# Patient Record
Sex: MALE | Race: WHITE | Employment: OTHER | URBAN - METROPOLITAN AREA
[De-identification: names, ages, dates, MRNs, and addresses within clinical notes are randomized per-mention and may not be internally consistent; named-entity substitution may affect disease eponyms.]

---

## 2017-05-12 ENCOUNTER — TELEPHONE (OUTPATIENT)
Dept: CARDIOLOGY CLINIC | Age: 82
End: 2017-05-12

## 2017-06-01 ENCOUNTER — OFFICE VISIT (OUTPATIENT)
Dept: CARDIOLOGY CLINIC | Age: 82
End: 2017-06-01

## 2017-06-01 VITALS
HEART RATE: 56 BPM | SYSTOLIC BLOOD PRESSURE: 110 MMHG | OXYGEN SATURATION: 97 % | WEIGHT: 174 LBS | DIASTOLIC BLOOD PRESSURE: 56 MMHG | BODY MASS INDEX: 24.36 KG/M2 | HEIGHT: 71 IN

## 2017-06-01 DIAGNOSIS — I10 ESSENTIAL HYPERTENSION: ICD-10-CM

## 2017-06-01 DIAGNOSIS — I25.119 CORONARY ARTERY DISEASE INVOLVING NATIVE CORONARY ARTERY OF NATIVE HEART WITH ANGINA PECTORIS (HCC): Primary | ICD-10-CM

## 2017-06-01 PROCEDURE — 4040F PNEUMOC VAC/ADMIN/RCVD: CPT | Performed by: NURSE PRACTITIONER

## 2017-06-01 PROCEDURE — 1111F DSCHRG MED/CURRENT MED MERGE: CPT | Performed by: NURSE PRACTITIONER

## 2017-06-01 PROCEDURE — G8420 CALC BMI NORM PARAMETERS: HCPCS | Performed by: NURSE PRACTITIONER

## 2017-06-01 PROCEDURE — 1036F TOBACCO NON-USER: CPT | Performed by: NURSE PRACTITIONER

## 2017-06-01 PROCEDURE — G8598 ASA/ANTIPLAT THER USED: HCPCS | Performed by: NURSE PRACTITIONER

## 2017-06-01 PROCEDURE — G8427 DOCREV CUR MEDS BY ELIG CLIN: HCPCS | Performed by: NURSE PRACTITIONER

## 2017-06-01 PROCEDURE — 1123F ACP DISCUSS/DSCN MKR DOCD: CPT | Performed by: NURSE PRACTITIONER

## 2017-06-01 PROCEDURE — 99213 OFFICE O/P EST LOW 20 MIN: CPT | Performed by: NURSE PRACTITIONER

## 2017-12-01 ENCOUNTER — OFFICE VISIT (OUTPATIENT)
Dept: CARDIOLOGY CLINIC | Age: 82
End: 2017-12-01

## 2017-12-01 VITALS
HEART RATE: 58 BPM | BODY MASS INDEX: 23.66 KG/M2 | OXYGEN SATURATION: 98 % | DIASTOLIC BLOOD PRESSURE: 64 MMHG | HEIGHT: 71 IN | SYSTOLIC BLOOD PRESSURE: 138 MMHG | WEIGHT: 169 LBS

## 2017-12-01 DIAGNOSIS — I25.119 CORONARY ARTERY DISEASE INVOLVING NATIVE CORONARY ARTERY OF NATIVE HEART WITH ANGINA PECTORIS (HCC): Primary | ICD-10-CM

## 2017-12-01 DIAGNOSIS — I10 ESSENTIAL HYPERTENSION: ICD-10-CM

## 2017-12-01 DIAGNOSIS — E78.2 MIXED HYPERLIPIDEMIA: ICD-10-CM

## 2017-12-01 PROCEDURE — 4040F PNEUMOC VAC/ADMIN/RCVD: CPT | Performed by: INTERNAL MEDICINE

## 2017-12-01 PROCEDURE — 1036F TOBACCO NON-USER: CPT | Performed by: INTERNAL MEDICINE

## 2017-12-01 PROCEDURE — G8420 CALC BMI NORM PARAMETERS: HCPCS | Performed by: INTERNAL MEDICINE

## 2017-12-01 PROCEDURE — G8427 DOCREV CUR MEDS BY ELIG CLIN: HCPCS | Performed by: INTERNAL MEDICINE

## 2017-12-01 PROCEDURE — G8484 FLU IMMUNIZE NO ADMIN: HCPCS | Performed by: INTERNAL MEDICINE

## 2017-12-01 PROCEDURE — 99214 OFFICE O/P EST MOD 30 MIN: CPT | Performed by: INTERNAL MEDICINE

## 2017-12-01 PROCEDURE — G8598 ASA/ANTIPLAT THER USED: HCPCS | Performed by: INTERNAL MEDICINE

## 2017-12-01 PROCEDURE — 1123F ACP DISCUSS/DSCN MKR DOCD: CPT | Performed by: INTERNAL MEDICINE

## 2017-12-01 NOTE — PROGRESS NOTES
hepatomegaly  Extremities: No edema, cyanosis, or clubbing. Pulses are 2+ radial/dorsalis pedis bilaterally. Cap refill brisk. R wrist site unremarkable. Neurological: No focal deficits. Skin: Skin is warm and dry. Psychiatric: He has a normal mood and affect. His speech is normal and behavior is normal.     Lab Review:   Lab Results   Component Value Date    TRIG 133 10/03/2017    HDL 46 10/03/2017    LDLCALC 62 10/03/2017    LABVLDL 27 10/03/2017     Lab Results   Component Value Date     10/03/2017    K 4.6 10/03/2017     10/03/2017    CO2 23 10/03/2017    BUN 25 10/03/2017    CREATININE 1.2 10/03/2017    GLUCOSE 94 10/03/2017    CALCIUM 9.6 10/03/2017      Lab Results   Component Value Date    WBC 6.3 05/10/2017    HGB 12.9 (L) 05/10/2017    HCT 38.4 (L) 05/10/2017    MCV 92.8 05/10/2017     05/10/2017     Lexiscan-Myoview 5/11/2017:  Normal LV size and systolic function.    Large mixed inferior partially reversible defect that likely represents    scar/ischemia. There is also diaphragmatic attenuation.    Non-diagnostic EKG response due to failure to reach target heart rate .    Overall findings represent a intermediate risk scan.      Angiogram/PCI 5/15/2017:  1. Dominant right coronary artery with a 99% posterior descending artery  ostial stenosis. 2. Patent left main trunk. 3. A 30% to 40% mid left anterior descending stenosis. 4. A 20% to 30% stenosis of the mid circumflex artery, which is small artery. 5. Left-to-right collaterals from both circumflex and the left anterior  descending artery. 6. Left ventricular systolic function with normal left ____ hemodynamics. 7. Successful angioplasty followed by drug-eluting stent deployment in the  posterior descending artery branch of the right coronary artery, 99% stenosis  reduced to 0% with a final diameter of 3.11 in the proximal segment and 2.91  in the distal segment with no side effects.        Assessment:    Coronary artery

## 2017-12-01 NOTE — PATIENT INSTRUCTIONS
trans fat  · Read food labels, and try to avoid saturated and trans fats. They increase your risk of heart disease. Trans fat is found in many processed foods such as cookies and crackers. · Use olive or canola oil when you cook. Try cholesterol-lowering spreads, such as Benecol or Take Control. · Bake, broil, grill, or steam foods instead of frying them. · Choose lean meats instead of high-fat meats such as hot dogs and sausages. Cut off all visible fat when you prepare meat. · Eat fish, skinless poultry, and meat alternatives such as soy products instead of high-fat meats. Soy products, such as tofu, may be especially good for your heart. · Choose low-fat or fat-free milk and dairy products. Eat fish  · Eat at least two servings of fish a week. Certain fish, such as salmon and tuna, contain omega-3 fatty acids, which may help reduce your risk of heart attack. Eat foods high in fiber  · Eat a variety of grain products every day. Include whole-grain foods that have lots of fiber and nutrients. Examples of whole-grain foods include oats, whole wheat bread, and brown rice. · Buy whole-grain breads and cereals, instead of white bread or pastries. Limit salt and sodium  · Limit how much salt and sodium you eat to help lower your blood pressure. · Taste food before you salt it. Add only a little salt when you think you need it. With time, your taste buds will adjust to less salt. · Eat fewer snack items, fast foods, and other high-salt, processed foods. Check food labels for the amount of sodium in packaged foods. · Choose low-sodium versions of canned goods (such as soups, vegetables, and beans). Limit sugar  · Limit drinks and foods with added sugar. These include candy, desserts, and soda pop. Limit alcohol  · Limit alcohol to no more than 2 drinks a day for men and 1 drink a day for women. Too much alcohol can cause health problems. When should you call for help?   Watch closely for changes in your [employer], and be sure to contact your doctor if:  · You would like help planning heart-healthy meals. Where can you learn more? Go to https://chpepiceweb.OTOY. org and sign in to your [employer] account. Enter V137 in the [employer] box to learn more about \"Heart-Healthy Diet: Care Instructions. \"     If you do not have an account, please click on the \"Sign Up Now\" link. Current as of: April 3, 2017  Content Version: 11.3  © 2773-0985 [employer]. Care instructions adapted under license by [facility] ([facility]). If you have questions about a medical condition or this instruction, always ask your healthcare professional. [address] any warranty or liability for your use of this information. Patient Education        High Blood Pressure: Care Instructions  Your Care Instructions  If your blood pressure is usually above 140/90, you have high blood pressure, or hypertension. That means the top number is 140 or higher or the bottom number is 90 or higher, or both. Despite what a lot of people think, high blood pressure usually doesn't cause headaches or make you feel dizzy or lightheaded. It usually has no symptoms. But it does increase your risk for heart attack, stroke, and kidney or eye damage. The higher your blood pressure, the more your risk increases. Your doctor will give you a goal for your blood pressure. Your goal will be based on your health and your age. An example of a goal is to keep your blood pressure below 140/90. Lifestyle changes, such as eating healthy and being active, are always important to help lower blood pressure. You might also take medicine to reach your blood pressure goal.  Follow-up care is a key part of your treatment and safety. Be sure to make and go to all appointments, and call your doctor if you are having problems. It's also a good idea to know your test results and keep a list of the medicines you take.   How can you care for

## 2018-04-03 PROBLEM — R07.9 CHEST PAIN: Status: ACTIVE | Noted: 2018-04-03

## 2018-04-08 ENCOUNTER — CARE COORDINATION (OUTPATIENT)
Dept: CASE MANAGEMENT | Age: 83
End: 2018-04-08

## 2018-04-08 DIAGNOSIS — R07.9 CHEST PAIN, UNSPECIFIED TYPE: Primary | ICD-10-CM

## 2018-04-08 PROCEDURE — 1111F DSCHRG MED/CURRENT MED MERGE: CPT | Performed by: INTERNAL MEDICINE

## 2018-04-11 ENCOUNTER — CARE COORDINATION (OUTPATIENT)
Dept: CASE MANAGEMENT | Age: 83
End: 2018-04-11

## 2018-04-18 ENCOUNTER — CARE COORDINATION (OUTPATIENT)
Dept: CASE MANAGEMENT | Age: 83
End: 2018-04-18

## 2018-04-20 ENCOUNTER — CARE COORDINATION (OUTPATIENT)
Dept: CASE MANAGEMENT | Age: 83
End: 2018-04-20

## 2018-04-20 ENCOUNTER — OFFICE VISIT (OUTPATIENT)
Dept: CARDIOLOGY CLINIC | Age: 83
End: 2018-04-20

## 2018-04-20 VITALS
SYSTOLIC BLOOD PRESSURE: 122 MMHG | BODY MASS INDEX: 22.54 KG/M2 | WEIGHT: 161 LBS | HEART RATE: 60 BPM | HEIGHT: 71 IN | DIASTOLIC BLOOD PRESSURE: 60 MMHG | OXYGEN SATURATION: 98 %

## 2018-04-20 DIAGNOSIS — E78.2 MIXED HYPERLIPIDEMIA: ICD-10-CM

## 2018-04-20 DIAGNOSIS — R07.89 OTHER CHEST PAIN: ICD-10-CM

## 2018-04-20 DIAGNOSIS — I10 ESSENTIAL HYPERTENSION: ICD-10-CM

## 2018-04-20 DIAGNOSIS — Z01.812 PRE-PROCEDURE LAB EXAM: ICD-10-CM

## 2018-04-20 DIAGNOSIS — I25.119 CORONARY ARTERY DISEASE INVOLVING NATIVE CORONARY ARTERY OF NATIVE HEART WITH ANGINA PECTORIS (HCC): Primary | ICD-10-CM

## 2018-04-20 DIAGNOSIS — R94.39 ABNORMAL CARDIOVASCULAR STRESS TEST: ICD-10-CM

## 2018-04-20 LAB
ANION GAP SERPL CALCULATED.3IONS-SCNC: 12 MMOL/L (ref 3–16)
BASOPHILS ABSOLUTE: 0.1 K/UL (ref 0–0.2)
BASOPHILS RELATIVE PERCENT: 1 %
BUN BLDV-MCNC: 25 MG/DL (ref 7–20)
CALCIUM SERPL-MCNC: 9 MG/DL (ref 8.3–10.6)
CHLORIDE BLD-SCNC: 104 MMOL/L (ref 99–110)
CO2: 27 MMOL/L (ref 21–32)
CREAT SERPL-MCNC: 1.3 MG/DL (ref 0.8–1.3)
EOSINOPHILS ABSOLUTE: 0.2 K/UL (ref 0–0.6)
EOSINOPHILS RELATIVE PERCENT: 2 %
GFR AFRICAN AMERICAN: >60
GFR NON-AFRICAN AMERICAN: 52
GLUCOSE BLD-MCNC: 75 MG/DL (ref 70–99)
HCT VFR BLD CALC: 39.1 % (ref 40.5–52.5)
HEMOGLOBIN: 13 G/DL (ref 13.5–17.5)
INR BLD: 1.05 (ref 0.85–1.15)
LYMPHOCYTES ABSOLUTE: 1.7 K/UL (ref 1–5.1)
LYMPHOCYTES RELATIVE PERCENT: 22.7 %
MCH RBC QN AUTO: 31.7 PG (ref 26–34)
MCHC RBC AUTO-ENTMCNC: 33.2 G/DL (ref 31–36)
MCV RBC AUTO: 95.5 FL (ref 80–100)
MONOCYTES ABSOLUTE: 0.6 K/UL (ref 0–1.3)
MONOCYTES RELATIVE PERCENT: 8.4 %
NEUTROPHILS ABSOLUTE: 4.9 K/UL (ref 1.7–7.7)
NEUTROPHILS RELATIVE PERCENT: 65.9 %
PDW BLD-RTO: 13.4 % (ref 12.4–15.4)
PLATELET # BLD: 200 K/UL (ref 135–450)
PMV BLD AUTO: 7.6 FL (ref 5–10.5)
POTASSIUM SERPL-SCNC: 5.2 MMOL/L (ref 3.5–5.1)
PROTHROMBIN TIME: 11.9 SEC (ref 9.6–13)
RBC # BLD: 4.09 M/UL (ref 4.2–5.9)
SODIUM BLD-SCNC: 143 MMOL/L (ref 136–145)
WBC # BLD: 7.5 K/UL (ref 4–11)

## 2018-04-20 PROCEDURE — 1123F ACP DISCUSS/DSCN MKR DOCD: CPT | Performed by: INTERNAL MEDICINE

## 2018-04-20 PROCEDURE — 1111F DSCHRG MED/CURRENT MED MERGE: CPT | Performed by: INTERNAL MEDICINE

## 2018-04-20 PROCEDURE — G8427 DOCREV CUR MEDS BY ELIG CLIN: HCPCS | Performed by: INTERNAL MEDICINE

## 2018-04-20 PROCEDURE — G8598 ASA/ANTIPLAT THER USED: HCPCS | Performed by: INTERNAL MEDICINE

## 2018-04-20 PROCEDURE — 4040F PNEUMOC VAC/ADMIN/RCVD: CPT | Performed by: INTERNAL MEDICINE

## 2018-04-20 PROCEDURE — G8420 CALC BMI NORM PARAMETERS: HCPCS | Performed by: INTERNAL MEDICINE

## 2018-04-20 PROCEDURE — 1036F TOBACCO NON-USER: CPT | Performed by: INTERNAL MEDICINE

## 2018-04-20 PROCEDURE — 99215 OFFICE O/P EST HI 40 MIN: CPT | Performed by: INTERNAL MEDICINE

## 2018-04-24 ENCOUNTER — TELEPHONE (OUTPATIENT)
Dept: CARDIOLOGY CLINIC | Age: 83
End: 2018-04-24

## 2018-04-25 ENCOUNTER — HOSPITAL ENCOUNTER (OUTPATIENT)
Dept: CARDIAC CATH/INVASIVE PROCEDURES | Age: 83
Discharge: HOME | End: 2018-04-25
Attending: INTERNAL MEDICINE | Admitting: INTERNAL MEDICINE

## 2018-04-25 VITALS — HEIGHT: 71 IN | WEIGHT: 161.38 LBS | BODY MASS INDEX: 22.59 KG/M2

## 2018-04-25 PROCEDURE — 99152 MOD SED SAME PHYS/QHP 5/>YRS: CPT | Performed by: INTERNAL MEDICINE

## 2018-04-25 PROCEDURE — 93454 CORONARY ARTERY ANGIO S&I: CPT | Performed by: INTERNAL MEDICINE

## 2018-04-25 RX ORDER — ASPIRIN 325 MG
325 TABLET ORAL ONCE
Status: DISCONTINUED | OUTPATIENT
Start: 2018-04-25 | End: 2018-04-25

## 2018-04-25 RX ORDER — SODIUM CHLORIDE 9 MG/ML
INJECTION, SOLUTION INTRAVENOUS CONTINUOUS
Status: DISCONTINUED | OUTPATIENT
Start: 2018-04-25 | End: 2018-04-25 | Stop reason: SDUPTHER

## 2018-04-25 RX ORDER — SODIUM CHLORIDE 0.9 % (FLUSH) 0.9 %
10 SYRINGE (ML) INJECTION EVERY 12 HOURS SCHEDULED
Status: DISCONTINUED | OUTPATIENT
Start: 2018-04-25 | End: 2018-04-26 | Stop reason: HOSPADM

## 2018-04-25 RX ORDER — SODIUM CHLORIDE 0.9 % (FLUSH) 0.9 %
10 SYRINGE (ML) INJECTION EVERY 12 HOURS SCHEDULED
Status: DISCONTINUED | OUTPATIENT
Start: 2018-04-25 | End: 2018-04-25 | Stop reason: SDUPTHER

## 2018-04-25 RX ORDER — ONDANSETRON 2 MG/ML
4 INJECTION INTRAMUSCULAR; INTRAVENOUS EVERY 6 HOURS PRN
Status: DISCONTINUED | OUTPATIENT
Start: 2018-04-25 | End: 2018-04-26 | Stop reason: HOSPADM

## 2018-04-25 RX ORDER — SODIUM CHLORIDE 9 MG/ML
INJECTION, SOLUTION INTRAVENOUS CONTINUOUS
Status: DISCONTINUED | OUTPATIENT
Start: 2018-04-25 | End: 2018-04-26 | Stop reason: HOSPADM

## 2018-04-25 RX ORDER — ACETAMINOPHEN 325 MG/1
650 TABLET ORAL EVERY 4 HOURS PRN
Status: DISCONTINUED | OUTPATIENT
Start: 2018-04-25 | End: 2018-04-26 | Stop reason: HOSPADM

## 2018-04-25 RX ORDER — ASPIRIN 81 MG/1
81 TABLET, CHEWABLE ORAL ONCE
Status: DISCONTINUED | OUTPATIENT
Start: 2018-04-25 | End: 2018-04-25

## 2018-04-25 RX ORDER — SODIUM CHLORIDE 0.9 % (FLUSH) 0.9 %
10 SYRINGE (ML) INJECTION PRN
Status: DISCONTINUED | OUTPATIENT
Start: 2018-04-25 | End: 2018-04-25 | Stop reason: SDUPTHER

## 2018-04-25 RX ORDER — SODIUM CHLORIDE 0.9 % (FLUSH) 0.9 %
10 SYRINGE (ML) INJECTION EVERY 12 HOURS SCHEDULED
Status: DISCONTINUED | OUTPATIENT
Start: 2018-04-25 | End: 2018-04-25 | Stop reason: ALTCHOICE

## 2018-04-25 RX ORDER — SODIUM CHLORIDE 0.9 % (FLUSH) 0.9 %
10 SYRINGE (ML) INJECTION PRN
Status: DISCONTINUED | OUTPATIENT
Start: 2018-04-25 | End: 2018-04-26 | Stop reason: HOSPADM

## 2018-04-25 RX ORDER — SODIUM CHLORIDE 0.9 % (FLUSH) 0.9 %
10 SYRINGE (ML) INJECTION PRN
Status: DISCONTINUED | OUTPATIENT
Start: 2018-04-25 | End: 2018-04-25 | Stop reason: ALTCHOICE

## 2018-06-01 ENCOUNTER — OFFICE VISIT (OUTPATIENT)
Dept: CARDIOLOGY CLINIC | Age: 83
End: 2018-06-01

## 2018-06-01 VITALS
OXYGEN SATURATION: 97 % | HEIGHT: 71 IN | SYSTOLIC BLOOD PRESSURE: 136 MMHG | WEIGHT: 164 LBS | HEART RATE: 62 BPM | DIASTOLIC BLOOD PRESSURE: 64 MMHG | BODY MASS INDEX: 22.96 KG/M2

## 2018-06-01 DIAGNOSIS — E78.2 MIXED HYPERLIPIDEMIA: ICD-10-CM

## 2018-06-01 DIAGNOSIS — I10 ESSENTIAL HYPERTENSION: ICD-10-CM

## 2018-06-01 DIAGNOSIS — I25.10 CORONARY ARTERY DISEASE INVOLVING NATIVE CORONARY ARTERY OF NATIVE HEART WITHOUT ANGINA PECTORIS: Primary | ICD-10-CM

## 2018-06-01 PROCEDURE — G8427 DOCREV CUR MEDS BY ELIG CLIN: HCPCS | Performed by: INTERNAL MEDICINE

## 2018-06-01 PROCEDURE — 4040F PNEUMOC VAC/ADMIN/RCVD: CPT | Performed by: INTERNAL MEDICINE

## 2018-06-01 PROCEDURE — G8420 CALC BMI NORM PARAMETERS: HCPCS | Performed by: INTERNAL MEDICINE

## 2018-06-01 PROCEDURE — G8598 ASA/ANTIPLAT THER USED: HCPCS | Performed by: INTERNAL MEDICINE

## 2018-06-01 PROCEDURE — 1036F TOBACCO NON-USER: CPT | Performed by: INTERNAL MEDICINE

## 2018-06-01 PROCEDURE — 1123F ACP DISCUSS/DSCN MKR DOCD: CPT | Performed by: INTERNAL MEDICINE

## 2018-06-01 PROCEDURE — 99214 OFFICE O/P EST MOD 30 MIN: CPT | Performed by: INTERNAL MEDICINE

## 2018-10-01 ENCOUNTER — HOSPITAL ENCOUNTER (OUTPATIENT)
Dept: CT IMAGING | Age: 83
Discharge: HOME | End: 2018-10-01
Payer: MEDICARE

## 2018-10-01 ENCOUNTER — HOSPITAL ENCOUNTER (OUTPATIENT)
Dept: GENERAL RADIOLOGY | Age: 83
Discharge: HOME | End: 2018-10-01
Payer: MEDICARE

## 2018-10-01 DIAGNOSIS — R10.84 GENERALIZED ABDOMINAL PAIN: ICD-10-CM

## 2018-10-01 DIAGNOSIS — M54.9 BACK PAIN, UNSPECIFIED BACK LOCATION, UNSPECIFIED BACK PAIN LATERALITY, UNSPECIFIED CHRONICITY: ICD-10-CM

## 2018-10-01 PROCEDURE — 72072 X-RAY EXAM THORAC SPINE 3VWS: CPT

## 2018-10-01 PROCEDURE — 72110 X-RAY EXAM L-2 SPINE 4/>VWS: CPT

## 2018-10-01 PROCEDURE — 74176 CT ABD & PELVIS W/O CONTRAST: CPT

## 2018-10-04 ENCOUNTER — HOSPITAL ENCOUNTER (OUTPATIENT)
Dept: MRI IMAGING | Age: 83
Discharge: HOME | End: 2018-10-04
Payer: MEDICARE

## 2018-10-04 DIAGNOSIS — E78.2 MIXED HYPERLIPIDEMIA: ICD-10-CM

## 2018-10-04 DIAGNOSIS — R10.84 GENERALIZED ABDOMINAL PAIN: ICD-10-CM

## 2018-10-04 LAB
A/G RATIO: 1.5 (ref 1.1–2.2)
ALBUMIN SERPL-MCNC: 4.1 G/DL (ref 3.4–5)
ALP BLD-CCNC: 587 U/L (ref 40–129)
ALT SERPL-CCNC: 10 U/L (ref 10–40)
ANION GAP SERPL CALCULATED.3IONS-SCNC: 10 MMOL/L (ref 3–16)
AST SERPL-CCNC: 20 U/L (ref 15–37)
BILIRUB SERPL-MCNC: 0.3 MG/DL (ref 0–1)
BUN BLDV-MCNC: 25 MG/DL (ref 7–20)
CALCIUM SERPL-MCNC: 9.3 MG/DL (ref 8.3–10.6)
CHLORIDE BLD-SCNC: 105 MMOL/L (ref 99–110)
CHOLESTEROL, TOTAL: 165 MG/DL (ref 0–199)
CO2: 28 MMOL/L (ref 21–32)
CREAT SERPL-MCNC: 1.1 MG/DL (ref 0.8–1.3)
GFR AFRICAN AMERICAN: >60
GFR NON-AFRICAN AMERICAN: >60
GLOBULIN: 2.7 G/DL
GLUCOSE BLD-MCNC: 113 MG/DL (ref 70–99)
HDLC SERPL-MCNC: 55 MG/DL (ref 40–60)
LDL CHOLESTEROL CALCULATED: 88 MG/DL
POTASSIUM SERPL-SCNC: 4.8 MMOL/L (ref 3.5–5.1)
SODIUM BLD-SCNC: 143 MMOL/L (ref 136–145)
TOTAL PROTEIN: 6.8 G/DL (ref 6.4–8.2)
TRIGL SERPL-MCNC: 111 MG/DL (ref 0–150)
VLDLC SERPL CALC-MCNC: 22 MG/DL

## 2018-10-04 PROCEDURE — A9577 INJ MULTIHANCE: HCPCS | Performed by: INTERNAL MEDICINE

## 2018-10-04 PROCEDURE — 80061 LIPID PANEL: CPT

## 2018-10-04 PROCEDURE — 6360000004 HC RX CONTRAST MEDICATION: Performed by: INTERNAL MEDICINE

## 2018-10-04 PROCEDURE — 74183 MRI ABD W/O CNTR FLWD CNTR: CPT

## 2018-10-04 PROCEDURE — 80053 COMPREHEN METABOLIC PANEL: CPT

## 2018-10-04 PROCEDURE — 36415 COLL VENOUS BLD VENIPUNCTURE: CPT

## 2018-10-04 RX ADMIN — GADOBENATE DIMEGLUMINE 15 ML: 529 INJECTION, SOLUTION INTRAVENOUS at 15:47

## 2018-10-11 DIAGNOSIS — Z79.899 ON STATIN THERAPY: ICD-10-CM

## 2018-10-11 DIAGNOSIS — E78.00 HIGH CHOLESTEROL: ICD-10-CM

## 2018-10-11 DIAGNOSIS — I25.119 CORONARY ARTERY DISEASE INVOLVING NATIVE CORONARY ARTERY OF NATIVE HEART WITH ANGINA PECTORIS (HCC): Primary | ICD-10-CM

## 2018-10-11 RX ORDER — ATORVASTATIN CALCIUM 80 MG/1
80 TABLET, FILM COATED ORAL DAILY
Qty: 90 TABLET | Refills: 3 | Status: SHIPPED | OUTPATIENT
Start: 2018-10-11 | End: 2019-05-09

## 2018-10-11 NOTE — TELEPHONE ENCOUNTER
Lipitor increased to 80 mg daily by Tadeo Palacios NP after reviewing lab results. Already spoke to patient with result message and instructions. Patient verbalized and confirmed understanding.

## 2018-10-23 ENCOUNTER — APPOINTMENT (OUTPATIENT)
Dept: GENERAL RADIOLOGY | Age: 83
End: 2018-10-23
Payer: MEDICARE

## 2018-10-23 ENCOUNTER — APPOINTMENT (OUTPATIENT)
Dept: CT IMAGING | Age: 83
End: 2018-10-23
Payer: MEDICARE

## 2018-10-23 ENCOUNTER — HOSPITAL ENCOUNTER (EMERGENCY)
Age: 83
Discharge: HOME | End: 2018-10-23
Attending: EMERGENCY MEDICINE
Payer: MEDICARE

## 2018-10-23 VITALS
OXYGEN SATURATION: 97 % | RESPIRATION RATE: 18 BRPM | SYSTOLIC BLOOD PRESSURE: 126 MMHG | BODY MASS INDEX: 23.52 KG/M2 | HEART RATE: 80 BPM | HEIGHT: 71 IN | WEIGHT: 167.99 LBS | DIASTOLIC BLOOD PRESSURE: 82 MMHG | TEMPERATURE: 98 F

## 2018-10-23 DIAGNOSIS — R06.89 DYSPNEA AND RESPIRATORY ABNORMALITIES: Primary | ICD-10-CM

## 2018-10-23 DIAGNOSIS — R06.00 DYSPNEA AND RESPIRATORY ABNORMALITIES: Primary | ICD-10-CM

## 2018-10-23 DIAGNOSIS — R91.8 PULMONARY NODULES: ICD-10-CM

## 2018-10-23 LAB
A/G RATIO: 1.2 (ref 1.1–2.2)
ALBUMIN SERPL-MCNC: 3.8 G/DL (ref 3.4–5)
ALP BLD-CCNC: 593 U/L (ref 40–129)
ALT SERPL-CCNC: 14 U/L (ref 10–40)
ANION GAP SERPL CALCULATED.3IONS-SCNC: 12 MMOL/L (ref 3–16)
AST SERPL-CCNC: 24 U/L (ref 15–37)
BASOPHILS ABSOLUTE: 0.1 K/UL (ref 0–0.2)
BASOPHILS RELATIVE PERCENT: 0.8 %
BILIRUB SERPL-MCNC: 0.4 MG/DL (ref 0–1)
BUN BLDV-MCNC: 23 MG/DL (ref 7–20)
CALCIUM SERPL-MCNC: 9.4 MG/DL (ref 8.3–10.6)
CHLORIDE BLD-SCNC: 103 MMOL/L (ref 99–110)
CO2: 24 MMOL/L (ref 21–32)
CREAT SERPL-MCNC: 1 MG/DL (ref 0.8–1.3)
D DIMER: 1703 NG/ML DDU (ref 0–229)
EOSINOPHILS ABSOLUTE: 0.1 K/UL (ref 0–0.6)
EOSINOPHILS RELATIVE PERCENT: 1.8 %
GFR AFRICAN AMERICAN: >60
GFR NON-AFRICAN AMERICAN: >60
GLOBULIN: 3.1 G/DL
GLUCOSE BLD-MCNC: 95 MG/DL (ref 70–99)
HCT VFR BLD CALC: 39.1 % (ref 40.5–52.5)
HEMOGLOBIN: 12.8 G/DL (ref 13.5–17.5)
LYMPHOCYTES ABSOLUTE: 1.6 K/UL (ref 1–5.1)
LYMPHOCYTES RELATIVE PERCENT: 22.3 %
MCH RBC QN AUTO: 30.7 PG (ref 26–34)
MCHC RBC AUTO-ENTMCNC: 32.8 G/DL (ref 31–36)
MCV RBC AUTO: 93.8 FL (ref 80–100)
MONOCYTES ABSOLUTE: 0.6 K/UL (ref 0–1.3)
MONOCYTES RELATIVE PERCENT: 8.2 %
NEUTROPHILS ABSOLUTE: 4.7 K/UL (ref 1.7–7.7)
NEUTROPHILS RELATIVE PERCENT: 66.9 %
PDW BLD-RTO: 13.4 % (ref 12.4–15.4)
PLATELET # BLD: 162 K/UL (ref 135–450)
PMV BLD AUTO: 7.5 FL (ref 5–10.5)
POTASSIUM SERPL-SCNC: 4.4 MMOL/L (ref 3.5–5.1)
PRO-BNP: 272 PG/ML (ref 0–449)
RBC # BLD: 4.17 M/UL (ref 4.2–5.9)
SODIUM BLD-SCNC: 139 MMOL/L (ref 136–145)
TOTAL PROTEIN: 6.9 G/DL (ref 6.4–8.2)
TROPONIN: <0.01 NG/ML
WBC # BLD: 7 K/UL (ref 4–11)

## 2018-10-23 PROCEDURE — 85379 FIBRIN DEGRADATION QUANT: CPT

## 2018-10-23 PROCEDURE — 71045 X-RAY EXAM CHEST 1 VIEW: CPT

## 2018-10-23 PROCEDURE — 84484 ASSAY OF TROPONIN QUANT: CPT

## 2018-10-23 PROCEDURE — 93005 ELECTROCARDIOGRAM TRACING: CPT | Performed by: PHYSICIAN ASSISTANT

## 2018-10-23 PROCEDURE — 85025 COMPLETE CBC W/AUTO DIFF WBC: CPT

## 2018-10-23 PROCEDURE — 99285 EMERGENCY DEPT VISIT HI MDM: CPT

## 2018-10-23 PROCEDURE — 6360000004 HC RX CONTRAST MEDICATION: Performed by: PHYSICIAN ASSISTANT

## 2018-10-23 PROCEDURE — 71260 CT THORAX DX C+: CPT

## 2018-10-23 PROCEDURE — 83880 ASSAY OF NATRIURETIC PEPTIDE: CPT

## 2018-10-23 PROCEDURE — 93010 ELECTROCARDIOGRAM REPORT: CPT | Performed by: INTERNAL MEDICINE

## 2018-10-23 PROCEDURE — 80053 COMPREHEN METABOLIC PANEL: CPT

## 2018-10-23 RX ORDER — IBUPROFEN 200 MG
400 TABLET ORAL DAILY
COMMUNITY
End: 2019-05-09

## 2018-10-23 RX ADMIN — IOPAMIDOL 75 ML: 755 INJECTION, SOLUTION INTRAVENOUS at 09:03

## 2018-10-23 ASSESSMENT — ENCOUNTER SYMPTOMS
VOMITING: 0
BACK PAIN: 0
ABDOMINAL PAIN: 0
SORE THROAT: 0
NAUSEA: 0
COUGH: 0
SHORTNESS OF BREATH: 1

## 2018-10-23 NOTE — ED PROVIDER NOTES
[facility]  eMERGENCY dEPARTMENT eNCOUnter   Physician Attestation    Pt Name: Eber Martinez  MRN: [id number]  [place] 10/27/1932  Date of evaluation: 10/23/18        Physician Note:    I havepersonally performed and/or participated in the history, exam and medical decision making and agree with all pertinent clinical information. I have also reviewed and agree with the past medical, family and social historyunless otherwise noted. I have personally performed a face to face diagnostic evaluation onthis patient. I have reviewed the mid-levels findings and agree. History: This is an 80-year-old male with known coronary artery disease. Had a positive stress test back in April which resulted in a cardiac catheterization however was determined that the stress test was falsely positive and medical management was continued. He comes in with at least a few days worth of palpitations and shortness of breath. Describes orthopnea as well as exertional shortness of breath. His wife took his pulse and she would note that it was irregular. He does describe some palpitations. On the EKG here this appears to be atrial fibrillation competing with sinus bradycardia. Basically the first 5 seconds of this EKG looks like A. fib and the second 5 seconds looks like sinus bradycardia. On the monitor I'm seeing frequent APCs but not A. fib. Physical Exam   Constitutional: He is oriented to person, place, and time. He appears well-developed and well-nourished. HENT:   Head: Normocephalic and atraumatic. Right Ear: External ear normal.   Left Ear: External ear normal.   Eyes: Right eye exhibits no discharge. Left eye exhibits no discharge. No scleral icterus. Neck: Normal range of motion. No JVD present. No tracheal deviation present. No thyromegaly present. Cardiovascular: Normal rate, regular rhythm and normal heart sounds. Frequent extrasystoles are present.  Exam reveals no gallop and no

## 2018-10-23 NOTE — ED NOTES
D/C: Order noted for d/c. Pt confirmed d/c paperwork  have correct name. Discharge and education instructions reviewed with patient. Teach-back successful. Pt verbalized understanding and signed d/c papers. Pt denied questions at this time. No acute distress noted. Patient instructed to follow-up as noted - return to emergency department if symptoms worsen. Patient verbalized understanding. Discharged per EDMD with discharge instructions. Pt discharged per ambulation to private vehicle. Patient stable upon departure. Thanked patient for choosing [facility] for care.           Benjie Dugan RN  10/23/18 4694

## 2018-10-23 NOTE — ED NOTES
Pt resting in bed at this time pt denies any pain  PA at bedside to update plan of care.       Marnie Calvillo RN  10/23/18 3346

## 2018-10-23 NOTE — ED PROVIDER NOTES
[facility]  eMERGENCY dEPARTMENT eNCOUnter      Pt Name: Devika Emerson  MRN: [id number]  [place] 10/27/1932  Date of evaluation: 10/23/2018  Provider: Joseph Yang PA-C    CHIEF COMPLAINT       Chief Complaint   Patient presents with    Chest Pain    Shortness of Breath         HISTORYOF PRESENT ILLNESS  (Location/Symptom, Timing/Onset, Context/Setting, Quality, Duration, Modifying Factors, Severity.)   Devika Emerson is a 80 y.o. male with a history of hypertension, hyperlipidemia, CAD who presents to the emergency department complaining of shortness of breath. When he checked in, the complaint listed as chest pain, however every time I ask if he is having chest pain he says \"not really\". He denies that he is in any pain at this time. The past 2 nights he has had difficulty sleeping secondary to shortness of breath. He denies any recent cough or fever or other symptoms. According to his son, the patient is an unreliable historian. He states he had a similar presentation back in April and he had a heart catheterization which showed his stent was intact and it was felt his stress test was falsely positive. Nursing Notes were reviewedand I agree. REVIEW OF SYSTEMS    (2-9 systems for level 4, 10 or more forlevel 5)     Review of Systems   Constitutional: Negative for chills and fever. HENT: Negative for sore throat. Respiratory: Positive for shortness of breath. Negative for cough. Cardiovascular: Negative for chest pain. Gastrointestinal: Negative for abdominal pain, nausea and vomiting. Genitourinary: Negative for difficulty urinating and dysuria. Musculoskeletal: Negative for back pain. Skin: Negative for rash. Neurological: Negative for light-headedness and headaches. Psychiatric/Behavioral: The patient is not nervous/anxious. All other systems reviewed and are negative.     Except as noted above the remainder ofthe review of systems appears well-developed and well-nourished. No distress. HENT:   Head: Normocephalic and atraumatic. Mouth/Throat: Oropharynx is clear and moist.   Eyes: Conjunctivae are normal.   Neck: Neck supple. Cardiovascular: Normal rate, regular rhythm and normal heart sounds. Pulmonary/Chest: Effort normal and breath sounds normal. No respiratory distress. He has no wheezes. He has no rales. Abdominal: Soft. There is no tenderness. Musculoskeletal: Normal range of motion. He exhibits edema (1+ pitting bilateral lower legs). Neurological: He is alert and oriented to person, place, and time. Skin: Skin is warm and dry. Psychiatric: He has a normal mood and affect. His behavior is normal.   Nursing note and vitals reviewed. DIAGNOSTIC RESULTS     EK-lead EKG interpreted as afib competing with sinus bradycardia with a rate of 70 bpm. No ST elevation or depression on my review. Please see Dr. Keshawn Kinney note for full interpretation. When compared with EKG dated 18, rhythm has changed. RADIOLOGY:   Non-plain film images such as CT, Ultrasound and MRI are read by the radiologist.Plain radiographic images are visualized and preliminarily interpreted by Herve Hubbard PA-C with the below findings:        Interpretation per the Radiologist below, if available at the time of this note:    CT Chest Pulmonary Embolism W Contrast   Final Result   No evidence of pulmonary embolism or acute pulmonary abnormality. Calcified atheromatous plaque and coronary calcifications. Marked osteopenia. Sequela of old granulomatous disease and tiny noncalcified 1-2 mm nodules,   favoring a benign process requiring no follow-up unless the patient has   history of known malignancy or risk factors warranting follow-up in 1 year. XR CHEST PORTABLE   Final Result   Stable examination. No acute focal abnormality.              LABS:  Labs Reviewed   CBC WITH AUTO DIFFERENTIAL - Abnormal; Notable for the following:        Result Value    RBC 4.17 (*)     Hemoglobin 12.8 (*)     Hematocrit 39.1 (*)     All other components within normal limits    Narrative:     Performed at:  [facility]  [address] [facility] [address]   Phone [phone]   COMPREHENSIVE METABOLIC PANEL - Abnormal; Notable for the following:     BUN 23 (*)     Alkaline Phosphatase 593 (*)     All other components within normal limits    Narrative:     Performed at:  [facility]  [address] [facility] [place] [address]   Phone [phone]   D-DIMER, QUANTITATIVE - Abnormal; Notable for the following:     D-Dimer, Quant 1703 (*)     All other components within normal limits    Narrative:     Performed at:  [address] [place] [facility] [address]   Phone [phone]   TROPONIN    Narrative:     Performed at:  [facility]  [address] [place] [address] [place] [facility] [address]   Phone [phone]   BRAIN NATRIURETIC PEPTIDE    Narrative:     Performed at:  [facility]  [address] [place] [address]   Phone [phone]       All other labs were within normal range or not returned as of this dictation. EMERGENCY DEPARTMENT COURSE and DIFFERENTIAL DIAGNOSIS/MDM:   Vitals:    Vitals:    10/23/18 0817 10/23/18 0832 10/23/18 0846 10/23/18 0958   BP: (!) 148/63 131/68 (!) 156/68 126/82   Pulse: 55 51 52 80   Resp: 15  15 18   Temp:    98 °F (36.7 °C)   TempSrc:    Oral   SpO2:  97% 97%    Weight:       Height:           I saw this patient with Dr. Mani Curiel who spent face-to-face time with the patient and agreed with my assessment and plan. The patient was stable and nontoxic appearing. This patient is bradycardic. He is not hypoxic. He is in no distress and asymptomatic here. Chest x-ray is stable. EKG shows no acute ischemic change.  He has a mis-transcribed.)    STEPHANIE Gr PA-C  10/23/18 1225

## 2018-10-24 LAB
EKG ATRIAL RATE: 96 BPM
EKG DIAGNOSIS: NORMAL
EKG Q-T INTERVAL: 434 MS
EKG QRS DURATION: 92 MS
EKG QTC CALCULATION (BAZETT): 468 MS
EKG R AXIS: 28 DEGREES
EKG T AXIS: 30 DEGREES
EKG VENTRICULAR RATE: 70 BPM

## 2019-04-12 ENCOUNTER — HOSPITAL ENCOUNTER (OUTPATIENT)
Dept: ULTRASOUND IMAGING | Age: 84
Discharge: HOME | End: 2019-04-12
Payer: MEDICARE

## 2019-04-12 DIAGNOSIS — D41.01 NEOPLASM OF UNCERTAIN BEHAVIOR OF KIDNEY, RIGHT: ICD-10-CM

## 2019-04-12 PROCEDURE — 76770 US EXAM ABDO BACK WALL COMP: CPT

## 2020-05-12 ENCOUNTER — HOSPITAL ENCOUNTER (EMERGENCY)
Age: 85
Discharge: HOME | End: 2020-05-12
Payer: MEDICARE

## 2020-05-12 VITALS
WEIGHT: 183.2 LBS | HEART RATE: 71 BPM | BODY MASS INDEX: 24.81 KG/M2 | OXYGEN SATURATION: 97 % | TEMPERATURE: 98 F | RESPIRATION RATE: 16 BRPM | SYSTOLIC BLOOD PRESSURE: 177 MMHG | HEIGHT: 72 IN | DIASTOLIC BLOOD PRESSURE: 77 MMHG

## 2020-05-12 PROCEDURE — 99283 EMERGENCY DEPT VISIT LOW MDM: CPT

## 2020-05-12 ASSESSMENT — ENCOUNTER SYMPTOMS
SHORTNESS OF BREATH: 0
NAUSEA: 0
COUGH: 0
ABDOMINAL PAIN: 0
VOMITING: 0

## 2020-05-12 NOTE — ED PROVIDER NOTES
[address] [facility]        Pt Name: Glenn Hicks  MRN: [id number]  [place] 10/27/1932  Date of evaluation: 5/12/2020  Provider: KIRK Parikh  PCP: Len Cordova MD    Evaluation by SARAH. My supervising physician was available for consultation. CHIEF COMPLAINT       Chief Complaint   Patient presents with    Rash     pt states he has had a rash over his entire body x 1 month       HISTORY OF PRESENT ILLNESS   (Location, Timing/Onset, Context/Setting, Quality, Duration, Modifying Factors, Severity, Associated Signs and Symptoms)  Note limiting factors. Glenn Hicks is a 80 y.o. male presents the emergency department today with complaints of a rash. He reports the rash is located all over his body. He states he has noticed it for the last month or so. He believes it started when he moved into his house about a month ago. He states it is not itchy, does not cause him any discomfort. He states he really does not see much today, but thought he should call an ambulance to be brought in for evaluation. He reports the rash seems to come and go, sometimes it is red, and sometimes it is not. He states he really has not noticed a pattern, for changes with temperature changes, with time of day, or any other event. He denies any chest pain, difficulty breathing, or difficulty swallowing. He has had no recent changes of any soaps or detergents. There are no further complaints at this time. Nursing Notes were all reviewed and agreed with or any disagreements were addressed in the HPI. REVIEW OF SYSTEMS    (2-9 systems for level 4, 10 or more for level 5)     Review of Systems   Constitutional: Negative for chills and fever. Respiratory: Negative for cough and shortness of breath. Cardiovascular: Negative for chest pain and palpitations. Gastrointestinal: Negative for abdominal pain, nausea and vomiting.    Genitourinary: Emergency Department Physician in the absence of a cardiologist.  Please see their note for interpretation of EKG. RADIOLOGY:   Non-plain film images such as CT, Ultrasound and MRI are read by the radiologist. Plain radiographic images are visualized and preliminarily interpreted by the ED Provider with the below findings:        Interpretation per the Radiologist below, if available at the time of this note:    No orders to display     No results found. PROCEDURES   Unless otherwise noted below, none     Procedures    CRITICAL CARE TIME   N/A    CONSULTS:  IP CONSULT TO PRIMARY CARE PROVIDER      EMERGENCY DEPARTMENT COURSE and DIFFERENTIAL DIAGNOSIS/MDM:   Vitals:    Vitals:    05/12/20 0849   BP: (!) 177/77   Pulse: 71   Resp: 16   Temp: 98 °F (36.7 °C)   TempSrc: Oral   SpO2: 97%   Weight: 183 lb 3.2 oz (83.1 kg)   Height: 6' (1.829 m)       Patient was given the following medications:  Medications - No data to display    ED COURSE & MEDICAL DECISION MAKING    Pertinent Labs & Imaging studies reviewed. (See chart for details)   -  Patient seen and evaluated in the emergency department. -  Triage and nursing notes reviewed and incorporated. -  Old chart records reviewed and incorporated. -  Evaluation by SARAH. My supervising physician was available for consultation.  -  Differential diagnosis includes: Allergic reaction, dermatitis, cellulitis, shingles, other  -  Work-up included:  See above  - Consults: I spoke with his primary care physician who advised that the patient does have dementia,, Dr Kathia Bazzi, and that she would be willing to see him in close interval follow-up sometime this week. -  No rashes appreciated on physical exam.  The patient states that he really feels like it is not present today like it has been in the past. Patient feels completely fine at this time, he is asymptomatic discharged home in stable condition, with close normal follow-up with his primary care provider.   At this time, we recommend discharge. The patient is agreeable with plan of care and disposition.  -  Disposition:  Discharge home in stable condition        FINAL IMPRESSION      1.  Skin anomaly          DISPOSITION/PLAN   DISPOSITION Decision To Discharge 05/12/2020 10:36:39 AM      PATIENT REFERREDTO:  Goldie Jimenez MD  [facility] [place] [address]  [phone]    Schedule an appointment as soon as possible for a visit in 2 days      [facility] Emergency Department  [address] [facility]  [phone]    If symptoms worsen      DISCHARGE MEDICATIONS:  Discharge Medication List as of 5/12/2020 10:45 AM          DISCONTINUED MEDICATIONS:  Discharge Medication List as of 5/12/2020 10:45 AM                 (Please note that portions of this note were completed with a voice recognition program.  Efforts were made to edit the dictations but occasionally words are mis-transcribed.)    KIRK Altman (electronically signed)            [place]  05/12/20 3037

## 2020-05-12 NOTE — ED TRIAGE NOTES
Pt states he has had a rash over his entire body x 1 month, denies any itching or pain. I do not see any rash on his body upon exam. EDPA made aware.

## 2020-06-25 ENCOUNTER — TELEPHONE (OUTPATIENT)
Dept: OTHER | Facility: CLINIC | Age: 85
End: 2020-06-25

## 2020-06-25 ENCOUNTER — APPOINTMENT (OUTPATIENT)
Dept: GENERAL RADIOLOGY | Age: 85
End: 2020-06-25
Payer: MEDICARE

## 2020-06-25 ENCOUNTER — HOSPITAL ENCOUNTER (EMERGENCY)
Age: 85
Discharge: HOME | End: 2020-06-25
Payer: MEDICARE

## 2020-06-25 VITALS
SYSTOLIC BLOOD PRESSURE: 170 MMHG | DIASTOLIC BLOOD PRESSURE: 82 MMHG | HEIGHT: 72 IN | WEIGHT: 178.19 LBS | BODY MASS INDEX: 24.14 KG/M2 | HEART RATE: 64 BPM | OXYGEN SATURATION: 98 % | RESPIRATION RATE: 16 BRPM | TEMPERATURE: 98.9 F

## 2020-06-25 LAB
REASON FOR REJECTION: NORMAL
REJECTED TEST: NORMAL

## 2020-06-25 PROCEDURE — 99283 EMERGENCY DEPT VISIT LOW MDM: CPT

## 2020-06-25 PROCEDURE — 71046 X-RAY EXAM CHEST 2 VIEWS: CPT

## 2020-06-25 NOTE — ED NOTES
Notified son at bs we need to repeat blood test for carbon monoxide, son declines and asks that pt be discharged. Pt has dementia, son states that he had water heater checked and there is no leak. Notified provider.       Andrey Modi RN  06/25/20 1692

## 2020-07-01 ASSESSMENT — ENCOUNTER SYMPTOMS
EYE DISCHARGE: 0
SHORTNESS OF BREATH: 0
APNEA: 0
VOMITING: 0
NAUSEA: 0
FACIAL SWELLING: 0
EYE REDNESS: 0
BACK PAIN: 0
CHOKING: 0
ABDOMINAL PAIN: 0

## 2021-03-14 ENCOUNTER — HOSPITAL ENCOUNTER (INPATIENT)
Age: 86
LOS: 2 days | Discharge: NURSING, REHAB OR LONG-TERM CARE | DRG: 101 | End: 2021-03-16
Attending: STUDENT IN AN ORGANIZED HEALTH CARE EDUCATION/TRAINING PROGRAM | Admitting: INTERNAL MEDICINE
Payer: MEDICARE

## 2021-03-14 ENCOUNTER — APPOINTMENT (OUTPATIENT)
Dept: CT IMAGING | Age: 86
DRG: 101 | End: 2021-03-14
Payer: MEDICARE

## 2021-03-14 ENCOUNTER — APPOINTMENT (OUTPATIENT)
Dept: GENERAL RADIOLOGY | Age: 86
DRG: 101 | End: 2021-03-14
Payer: MEDICARE

## 2021-03-14 DIAGNOSIS — R07.9 CHEST PAIN, UNSPECIFIED TYPE: Primary | ICD-10-CM

## 2021-03-14 PROBLEM — R56.9 SEIZURE (HCC): Status: ACTIVE | Noted: 2021-03-14

## 2021-03-14 LAB
ANION GAP SERPL CALCULATED.3IONS-SCNC: 5 MMOL/L (ref 3–16)
BASOPHILS ABSOLUTE: 0 K/UL (ref 0–0.2)
BASOPHILS RELATIVE PERCENT: 0.6 %
BILIRUBIN URINE: NEGATIVE
BLOOD, URINE: NEGATIVE
BUN BLDV-MCNC: 25 MG/DL (ref 7–20)
CALCIUM SERPL-MCNC: 9.5 MG/DL (ref 8.3–10.6)
CHLORIDE BLD-SCNC: 102 MMOL/L (ref 99–110)
CLARITY: CLEAR
CO2: 29 MMOL/L (ref 21–32)
COLOR: YELLOW
CREAT SERPL-MCNC: 1.1 MG/DL (ref 0.8–1.3)
D DIMER: 850 NG/ML DDU (ref 0–229)
EOSINOPHILS ABSOLUTE: 0.3 K/UL (ref 0–0.6)
EOSINOPHILS RELATIVE PERCENT: 3.8 %
EPITHELIAL CELLS, UA: ABNORMAL /HPF (ref 0–5)
GFR AFRICAN AMERICAN: >60
GFR NON-AFRICAN AMERICAN: >60
GLUCOSE BLD-MCNC: 96 MG/DL (ref 70–99)
GLUCOSE URINE: NEGATIVE MG/DL
HCT VFR BLD CALC: 37.1 % (ref 40.5–52.5)
HEMOGLOBIN: 12.1 G/DL (ref 13.5–17.5)
KETONES, URINE: NEGATIVE MG/DL
LEUKOCYTE ESTERASE, URINE: NEGATIVE
LYMPHOCYTES ABSOLUTE: 1.3 K/UL (ref 1–5.1)
LYMPHOCYTES RELATIVE PERCENT: 17 %
MAGNESIUM: 2.2 MG/DL (ref 1.8–2.4)
MCH RBC QN AUTO: 32.2 PG (ref 26–34)
MCHC RBC AUTO-ENTMCNC: 32.7 G/DL (ref 31–36)
MCV RBC AUTO: 98.7 FL (ref 80–100)
MICROSCOPIC EXAMINATION: YES
MONOCYTES ABSOLUTE: 0.8 K/UL (ref 0–1.3)
MONOCYTES RELATIVE PERCENT: 10.1 %
MUCUS: ABNORMAL /LPF
NEUTROPHILS ABSOLUTE: 5.3 K/UL (ref 1.7–7.7)
NEUTROPHILS RELATIVE PERCENT: 68.5 %
NITRITE, URINE: NEGATIVE
PDW BLD-RTO: 14.4 % (ref 12.4–15.4)
PH UA: 7.5 (ref 5–8)
PLATELET # BLD: 152 K/UL (ref 135–450)
PMV BLD AUTO: 7.7 FL (ref 5–10.5)
POTASSIUM REFLEX MAGNESIUM: 4.4 MMOL/L (ref 3.5–5.1)
PRO-BNP: 579 PG/ML (ref 0–449)
PROCALCITONIN: 0.07 NG/ML (ref 0–0.15)
PROTEIN UA: ABNORMAL MG/DL
RBC # BLD: 3.76 M/UL (ref 4.2–5.9)
RBC UA: ABNORMAL /HPF (ref 0–4)
SODIUM BLD-SCNC: 136 MMOL/L (ref 136–145)
SPECIFIC GRAVITY UA: 1.02 (ref 1–1.03)
TROPONIN: 0.02 NG/ML
TROPONIN: 0.03 NG/ML
TROPONIN: 0.03 NG/ML
URINE TYPE: ABNORMAL
UROBILINOGEN, URINE: 0.2 E.U./DL
WBC # BLD: 7.8 K/UL (ref 4–11)
WBC UA: ABNORMAL /HPF (ref 0–5)

## 2021-03-14 PROCEDURE — 93005 ELECTROCARDIOGRAM TRACING: CPT | Performed by: STUDENT IN AN ORGANIZED HEALTH CARE EDUCATION/TRAINING PROGRAM

## 2021-03-14 PROCEDURE — 99283 EMERGENCY DEPT VISIT LOW MDM: CPT

## 2021-03-14 PROCEDURE — 70450 CT HEAD/BRAIN W/O DYE: CPT

## 2021-03-14 PROCEDURE — 6360000004 HC RX CONTRAST MEDICATION: Performed by: STUDENT IN AN ORGANIZED HEALTH CARE EDUCATION/TRAINING PROGRAM

## 2021-03-14 PROCEDURE — U0003 INFECTIOUS AGENT DETECTION BY NUCLEIC ACID (DNA OR RNA); SEVERE ACUTE RESPIRATORY SYNDROME CORONAVIRUS 2 (SARS-COV-2) (CORONAVIRUS DISEASE [COVID-19]), AMPLIFIED PROBE TECHNIQUE, MAKING USE OF HIGH THROUGHPUT TECHNOLOGIES AS DESCRIBED BY CMS-2020-01-R: HCPCS

## 2021-03-14 PROCEDURE — 85379 FIBRIN DEGRADATION QUANT: CPT

## 2021-03-14 PROCEDURE — 85025 COMPLETE CBC W/AUTO DIFF WBC: CPT

## 2021-03-14 PROCEDURE — 71260 CT THORAX DX C+: CPT

## 2021-03-14 PROCEDURE — 36415 COLL VENOUS BLD VENIPUNCTURE: CPT

## 2021-03-14 PROCEDURE — 2060000000 HC ICU INTERMEDIATE R&B

## 2021-03-14 PROCEDURE — 83735 ASSAY OF MAGNESIUM: CPT

## 2021-03-14 PROCEDURE — 81001 URINALYSIS AUTO W/SCOPE: CPT

## 2021-03-14 PROCEDURE — 51798 US URINE CAPACITY MEASURE: CPT

## 2021-03-14 PROCEDURE — 2580000003 HC RX 258: Performed by: STUDENT IN AN ORGANIZED HEALTH CARE EDUCATION/TRAINING PROGRAM

## 2021-03-14 PROCEDURE — 80048 BASIC METABOLIC PNL TOTAL CA: CPT

## 2021-03-14 PROCEDURE — 71045 X-RAY EXAM CHEST 1 VIEW: CPT

## 2021-03-14 PROCEDURE — 2500000003 HC RX 250 WO HCPCS: Performed by: STUDENT IN AN ORGANIZED HEALTH CARE EDUCATION/TRAINING PROGRAM

## 2021-03-14 PROCEDURE — 84145 PROCALCITONIN (PCT): CPT

## 2021-03-14 PROCEDURE — 83880 ASSAY OF NATRIURETIC PEPTIDE: CPT

## 2021-03-14 PROCEDURE — 84484 ASSAY OF TROPONIN QUANT: CPT

## 2021-03-14 RX ORDER — RISPERIDONE 0.25 MG/1
0.25 TABLET, FILM COATED ORAL DAILY
Status: DISCONTINUED | OUTPATIENT
Start: 2021-03-15 | End: 2021-03-15

## 2021-03-14 RX ORDER — DIVALPROEX SODIUM 250 MG/1
250 TABLET, DELAYED RELEASE ORAL NIGHTLY
Status: ON HOLD | COMMUNITY
End: 2021-03-16 | Stop reason: HOSPADM

## 2021-03-14 RX ORDER — SODIUM CHLORIDE 0.9 % (FLUSH) 0.9 %
10 SYRINGE (ML) INJECTION EVERY 12 HOURS SCHEDULED
Status: DISCONTINUED | OUTPATIENT
Start: 2021-03-14 | End: 2021-03-16 | Stop reason: HOSPADM

## 2021-03-14 RX ORDER — ONDANSETRON 2 MG/ML
4 INJECTION INTRAMUSCULAR; INTRAVENOUS EVERY 6 HOURS PRN
Status: DISCONTINUED | OUTPATIENT
Start: 2021-03-14 | End: 2021-03-16 | Stop reason: HOSPADM

## 2021-03-14 RX ORDER — PROMETHAZINE HYDROCHLORIDE 25 MG/1
12.5 TABLET ORAL EVERY 6 HOURS PRN
Status: DISCONTINUED | OUTPATIENT
Start: 2021-03-14 | End: 2021-03-16 | Stop reason: HOSPADM

## 2021-03-14 RX ORDER — SODIUM CHLORIDE 0.9 % (FLUSH) 0.9 %
10 SYRINGE (ML) INJECTION PRN
Status: DISCONTINUED | OUTPATIENT
Start: 2021-03-14 | End: 2021-03-16 | Stop reason: HOSPADM

## 2021-03-14 RX ORDER — ACETAMINOPHEN 650 MG/1
650 SUPPOSITORY RECTAL EVERY 6 HOURS PRN
Status: DISCONTINUED | OUTPATIENT
Start: 2021-03-14 | End: 2021-03-16 | Stop reason: HOSPADM

## 2021-03-14 RX ORDER — RISPERIDONE 0.25 MG/1
0.25 TABLET, FILM COATED ORAL DAILY
Status: ON HOLD | COMMUNITY
End: 2021-03-16 | Stop reason: HOSPADM

## 2021-03-14 RX ORDER — ACETAMINOPHEN 325 MG/1
650 TABLET ORAL EVERY 8 HOURS PRN
COMMUNITY

## 2021-03-14 RX ORDER — TRAZODONE HYDROCHLORIDE 50 MG/1
25 TABLET ORAL NIGHTLY
Status: DISCONTINUED | OUTPATIENT
Start: 2021-03-15 | End: 2021-03-16 | Stop reason: HOSPADM

## 2021-03-14 RX ORDER — OLANZAPINE 10 MG/1
5 INJECTION, POWDER, LYOPHILIZED, FOR SOLUTION INTRAMUSCULAR
Status: COMPLETED | OUTPATIENT
Start: 2021-03-14 | End: 2021-03-14

## 2021-03-14 RX ORDER — CLOPIDOGREL BISULFATE 75 MG/1
75 TABLET ORAL DAILY
Status: ON HOLD | COMMUNITY
End: 2022-03-07 | Stop reason: HOSPADM

## 2021-03-14 RX ORDER — CLOPIDOGREL BISULFATE 75 MG/1
75 TABLET ORAL DAILY
Status: DISCONTINUED | OUTPATIENT
Start: 2021-03-15 | End: 2021-03-16 | Stop reason: HOSPADM

## 2021-03-14 RX ORDER — ESCITALOPRAM OXALATE 10 MG/1
10 TABLET ORAL DAILY
Status: DISCONTINUED | OUTPATIENT
Start: 2021-03-14 | End: 2021-03-14

## 2021-03-14 RX ORDER — VITAMIN B COMPLEX
5000 TABLET ORAL DAILY
Status: DISCONTINUED | OUTPATIENT
Start: 2021-03-15 | End: 2021-03-16 | Stop reason: HOSPADM

## 2021-03-14 RX ORDER — ACETAMINOPHEN 325 MG/1
650 TABLET ORAL EVERY 6 HOURS PRN
Status: DISCONTINUED | OUTPATIENT
Start: 2021-03-14 | End: 2021-03-16 | Stop reason: HOSPADM

## 2021-03-14 RX ORDER — POLYETHYLENE GLYCOL 3350 17 G/17G
17 POWDER, FOR SOLUTION ORAL DAILY PRN
Status: DISCONTINUED | OUTPATIENT
Start: 2021-03-14 | End: 2021-03-16 | Stop reason: HOSPADM

## 2021-03-14 RX ORDER — ATORVASTATIN CALCIUM 40 MG/1
40 TABLET, FILM COATED ORAL NIGHTLY
Status: DISCONTINUED | OUTPATIENT
Start: 2021-03-15 | End: 2021-03-16 | Stop reason: HOSPADM

## 2021-03-14 RX ORDER — MAGNESIUM HYDROXIDE/ALUMINUM HYDROXICE/SIMETHICONE 120; 1200; 1200 MG/30ML; MG/30ML; MG/30ML
30 SUSPENSION ORAL EVERY 4 HOURS PRN
COMMUNITY

## 2021-03-14 RX ORDER — TRAZODONE HYDROCHLORIDE 50 MG/1
50 TABLET ORAL NIGHTLY PRN
Status: ON HOLD | COMMUNITY
End: 2022-03-06

## 2021-03-14 RX ORDER — POLYETHYLENE GLYCOL 3350 17 G/17G
17 POWDER, FOR SOLUTION ORAL DAILY PRN
COMMUNITY

## 2021-03-14 RX ORDER — DIVALPROEX SODIUM 250 MG/1
250 TABLET, DELAYED RELEASE ORAL NIGHTLY
Status: DISCONTINUED | OUTPATIENT
Start: 2021-03-15 | End: 2021-03-15

## 2021-03-14 RX ORDER — PANTOPRAZOLE SODIUM 40 MG/1
40 TABLET, DELAYED RELEASE ORAL
Status: DISCONTINUED | OUTPATIENT
Start: 2021-03-15 | End: 2021-03-16 | Stop reason: HOSPADM

## 2021-03-14 RX ADMIN — IOPAMIDOL 80 ML: 755 INJECTION, SOLUTION INTRAVENOUS at 14:37

## 2021-03-14 RX ADMIN — OLANZAPINE 5 MG: 10 INJECTION, POWDER, FOR SOLUTION INTRAMUSCULAR at 19:05

## 2021-03-14 RX ADMIN — Medication 10 ML: at 20:30

## 2021-03-14 ASSESSMENT — PAIN DESCRIPTION - PAIN TYPE: TYPE: ACUTE PAIN

## 2021-03-14 ASSESSMENT — PAIN SCALES - PAIN ASSESSMENT IN ADVANCED DEMENTIA (PAINAD)
NEGVOCALIZATION: 0
BODYLANGUAGE: 0
NEGVOCALIZATION: 0
BREATHING: 0
FACIALEXPRESSION: 0
CONSOLABILITY: 0

## 2021-03-14 ASSESSMENT — PAIN DESCRIPTION - FREQUENCY: FREQUENCY: INTERMITTENT

## 2021-03-14 ASSESSMENT — PAIN DESCRIPTION - LOCATION: LOCATION: HEAD

## 2021-03-14 ASSESSMENT — PAIN SCALES - GENERAL: PAINLEVEL_OUTOF10: 0

## 2021-03-14 ASSESSMENT — PAIN DESCRIPTION - ORIENTATION: ORIENTATION: POSTERIOR

## 2021-03-14 ASSESSMENT — PAIN DESCRIPTION - ONSET: ONSET: GRADUAL

## 2021-03-14 ASSESSMENT — PAIN DESCRIPTION - DESCRIPTORS: DESCRIPTORS: ACHING;HEADACHE

## 2021-03-14 NOTE — ED TRIAGE NOTES
Pt is from [facility] s/p seizure and C/O CP. In A-fib initially. Alert and oriented x 2 now. Denies pain.

## 2021-03-14 NOTE — H&P
Internal Medicine PGY-1 Resident History & Physical      PCP: Chun Still MD    Date of Admission: 3/14/2021      Chief Complaint:  Seizures and Chest Pain        History Of Present Illness:      80 y.o. male with PMH of CAD, dementia, mood disorder (on depakote), hyperlipidemia, allergic rhinitis, back  pain who presented to [facility]. with Seizures and Chest Pain  Pt has dementia and is a poor historian due to pt's memory deficits. History was mostly obtained from pt's son Maikel Perez and from EMR. Per pt's son Maikel Perez pt has short term memory deficits and forgets events within 10 to 20 minutes. At the time of the encounter pt states he does not remember having had a seizure earlier and or having had chest pain. Pt's son states that he was not given much detail about the pt's seizure episode. Per ED notes pt comes from the [facility], where he had a one minute episode of sitting in a chair when he suddenly had a blank stare, was shaking both arms and head, dropped an item, but did not sustain a fall or hit his head. He was confused after the episode. Per son Maikel Perez pt was more confused than usual in the ED and initially did not recognize the son, but he states that he has not been able to see the pt over this past year and as they started talking the pt did recognize him and asked about the other son as well. The son states that the pt does not have a history of seizures but had Covid a couple of months ago and that he was treated for pneumonia 2 weeks ago as well. Pt's son states that in case of a cardiac arrest he would not like the pt to be resuscitated and would not like the pt to be intubated in case of respiratory worsening. ED course: Initial troponin elevated to 0.03. BNP elevated. Chest x-ray unremarkable. CT head unremarkable. D dimer positive, age adjusted. CTPA shows no acute PE, but concern for groundglass opacities. Per EMS, patient's EKG read as a fib.  Per ED on review of EKG performed by EMS, patient was in sinus bradycardia with significant artifact. P waves seen in lead III. Pt complained of chest pain in the ED but denied chest pain during this encounter. Past Medical History:          Diagnosis Date    Allergic rhinitis     Back pain     CAD (coronary artery disease)     Dementia ([facility] [address]     Hyperlipidemia        Past Surgical History:          Procedure Laterality Date    APPENDECTOMY      CHOLECYSTECTOMY      CORONARY ANGIOPLASTY WITH STENT PLACEMENT  05/2017    Stent X 1    JOINT REPLACEMENT Bilateral 1994    TONSILLECTOMY         Medications Prior to Admission:      Prior to Admission medications    Medication Sig Start Date End Date Taking?  Authorizing Provider   acetaminophen (TYLENOL) 325 MG tablet Take 650 mg by mouth every 6 hours as needed for Pain   Yes Historical Provider, MD   clopidogrel (PLAVIX) 75 MG tablet Take 75 mg by mouth daily   Yes Historical Provider, MD   divalproex (DEPAKOTE) 250 MG DR tablet Take 250 mg by mouth nightly   Yes Historical Provider, MD   aluminum & magnesium hydroxide-simethicone (MAALOX) 200-200-20 MG/5ML SUSP suspension Take 30 mLs by mouth every 4 hours as needed for Indigestion   Yes Historical Provider, MD   metoprolol tartrate (LOPRESSOR) 25 MG tablet Take 25 mg by mouth daily   Yes Historical Provider, MD   magnesium hydroxide (MILK OF MAGNESIA) 400 MG/5ML suspension Take 30 mLs by mouth daily as needed for Constipation   Yes Historical Provider, MD   polyethylene glycol (GLYCOLAX) 17 g packet Take 17 g by mouth daily   Yes Historical Provider, MD   risperiDONE (RISPERDAL) 0.25 MG tablet Take 0.25 mg by mouth daily With dinner   Yes Historical Provider, MD   traZODone (DESYREL) 50 MG tablet Take 25 mg by mouth nightly   Yes Historical Provider, MD   vitamin D (CHOLECALCIFEROL) 25 MCG (1000 UT) TABS tablet Take 5,000 Units by mouth daily   Yes Historical Provider, MD   donepezil (ARICEPT) 5 MG tablet TAKE ONE TABLET BY MOUTH ONCE NIGHTLY  Patient taking differently: Take 10 mg by mouth nightly  8/21/20  Yes Neal Anderson MD   atorvastatin (LIPITOR) 40 MG tablet TAKE ONE TABLET BY MOUTH ONCE NIGHTLY 8/21/20  Yes Neal Anderson MD   pantoprazole (PROTONIX) 40 MG tablet 1 tab po daily 12/11/19  Yes Neal Anderson MD       Allergies:  Pcn [penicillins]    Social History:      TOBACCO:   reports that he quit smoking about 56 years ago. He has never used smokeless tobacco.  ETOH:  reports current alcohol use of about 1.0 standard drinks of alcohol per week. Family History:     History reviewed. No pertinent family history. REVIEW OF SYSTEMS:   Unable to obtain due to pt's dementia. PHYSICAL EXAM PERFORMED:    BP (!) 151/81   Pulse 64   Temp 97.7 °F (36.5 °C) (Oral)   Resp 13   Ht 6' (1.829 m)   Wt 178 lb (80.7 kg)   SpO2 99%   BMI 24.14 kg/m²     General appearance:  No apparent distress, appears stated age and cooperative. HEENT:  Normal cephalic,atraumatic without obvious deformity. Pupils equal, round, and reactive to light. Extra ocular muscles intact. Conjunctivae/corneas clear. Neck: Supple, with full range of motion. No jugular venous distention. Trachea midline. Respiratory:  Normal respiratory effort. Clear to auscultation, bilaterally without Rales/Wheezes/Rhonchi. Cardiovascular:  Regular rate and rhythm with normal S1/S2 without murmurs, rubs or gallops. Abdomen: Soft, non-tender, non-distended with normal bowel sounds. Musculoskeletal: Trace edema in lower extremities. No clubbing, cyanosis bilaterally. Full range of motion without deformity. Skin: Skin color, texture, turgor normal.  Norashes or lesions. Neurologic:  Neurovascularly intact without any focal sensory/motor deficits. Cranialnerves: II-XII intact, grossly non-focal.  Psychiatric: Oriented to person and place. Alert.  thought content appropriate,normal insight  Capillary Refill: Brisk,< 3 seconds   Peripheral Pulses: +2 palpable, equal bilaterally       Labs:     Recent Labs     03/14/21  1143   WBC 7.8   HGB 12.1*   HCT 37.1*        Recent Labs     03/14/21  1143      K 4.4      CO2 29   BUN 25*   CREATININE 1.1   CALCIUM 9.5     No results for input(s): AST, ALT, BILIDIR, BILITOT, ALKPHOS in the last 72 hours. No results for input(s): INR in the last 72 hours. Recent Labs     03/14/21  1143 03/14/21  1339   TROPONINI 0.03* 0.03*       Urinalysis:      Lab Results   Component Value Date    NITRU Negative 03/14/2021    WBCUA 0-2 03/14/2021    RBCUA 3-4 03/14/2021    BLOODU Negative 03/14/2021    SPECGRAV 1.020 03/14/2021    GLUCOSEU Negative 03/14/2021       Radiology:       CT CHEST PULMONARY EMBOLISM W CONTRAST   Final Result      1. No evidence of pulmonary embolus. 2. Patchy groundglass opacities in right middle lobe and bilateral lower lobes, likely relate to multifocal pneumonia. Recommended follow-up as indicated clinically. CT Head WO Contrast   Final Result      No acute intracranial abnormality. Right maxillary sinus disease with air-fluid level. XR CHEST PORTABLE   Final Result      Bibasilar mild streaky opacities likely relate to atelectasis. ASSESSMENT & PLAN:  Active Hospital Problems    Diagnosis Date Noted    Seizure [facility]) [R56.9] 03/14/2021       Uche Mitchell is a 80 y.o. male who presents w/ Seizures and Chest Pain      Seizure - pt comes from the [facility], where he had a one minute episode of sitting in a chair when he suddenly had a blank stare, was shaking both arms and head, dropped an item, but did not sustain a fall or hit his head. He was confused after the episode but seemed to be at baseline in the ED. Pt's son states that pt has no hx of seizures. Pt's son states that pt had Covid a few months ago.   - seizure precautions  - BMP, CBC unremarkable  - UA negative for infection  - CT head negative for hemorrhage  - Neurology team consulted, appreciate recs  - CT chest showing patchy groundglass opacities in right middle lobe and bilateral lower lobes, likely relate to multifocal pneumonia - likely residual changes from recent Covid infection  - repeat interval CXR in 6 wks for resolution of opacities  - procalcitonin 0.07  - f/u spot EEG  - hx MRI abdomen 10/2018 showing mass lesion at the inferior pole right kidney suspicious of renal cell carcinoma or oncocytoma; US renal 4/2019 showing solid nodule in right kidney   - f/u brain MRI w/ and w/o contrast to evaluate for seizure focus    Chest pain - Per EMS, patient's EKG read as a fib. On review of EKG performed by EMS, patient is in sinus bradycardia with significant artifact. P waves seen in lead III. Pt has trace edema in lower extremities.   - EKG sinus bradycardia, premature atrial complexes  - troponin 0.03 x2  - proBNP 579  - trend troponin  - d-dimer 850  - f/u limited ECHO for trace edema in lower extremities  - f/u lactic acid  - telemetry monitoring    CAD  - plavix, statin  - f/u ECHO    Covid R/O  - f/u Covid testing     Mood disorder  - depakote  - trazodone    Hx dementia  - continue risperidone    HTN  - continue lopressor        DVT Prophylaxis: lovenox  Diet: No diet orders on file  Code Status: DNR-CCA  PT/OT Eval Status: ordered  Dispo - GMF      I will discuss the patient with the senior resident and Dr. Eloina Dowling MD.      Laxmi Corado MD  Internal Medicine Resident, PGY-1  03/14/21

## 2021-03-14 NOTE — PROGRESS NOTES
Pt placed in soft bilat wrist restraints due to combativeness- kicking/biting/scratching RNs and PCA, as well as multiple attempts to get out of bed. Order for restraints placed by on call resident. Will continue to monitor pt for absence of behavior that requires restraints.

## 2021-03-14 NOTE — ED NOTES
Bed: A07-07  Expected date:   Expected time:   Means of arrival:   Comments:  Adore Agrawal RN  03/14/21 1113

## 2021-03-14 NOTE — ED PROVIDER NOTES
ED Attending Attestation Note     Date of evaluation: 3/14/2021    This patient was seen by the resident. I have seen and examined the patient, agree with the workup, evaluation, management and diagnosis. The care plan has been discussed. I have reviewed the ECG and concur with the resident's interpretation. My assessment reveals 79-year-old male with a history of hypertension, CAD, and hyperlipidemia who presents after possible seizure then with chest pain. EMS reports the patient was initially in atrial fibrillation and confused, on arrival to the emergency department he is alert and oriented x2, unknown baseline mental status and unknown seizure history although he is not on any antiepileptic medications and has no mention of seizure in his previous medical history. We will plan on ACS work-up given his chest pain as well as CT scan and urinalysis given altered mental status and possible concern for seizure. Patient's laboratory work notable for elevated troponin at 0.03 and elevated D-dimer, underwent CT pulmonary embolus without concern for PE however it was noted that he had possible multifocal pneumonia. Of note patient was recently treated for pneumonia and has completed his course of antibiotics as of approximately 1 week ago, in the emergency department has not had any fever, hypoxia, cough or shortness of breath that was reported. We will plan on Covid swab despite patient having already received the code vaccination and procalcitonin to help guide inpatient therapy and monitoring.   Patient will be admitted to hospital.     Bren Green MD  03/14/21 0419

## 2021-03-14 NOTE — ED NOTES
Spoke with jessica from [facility], pt has not been covid swab since December.       Bud Aragon RN  03/14/21 [id number]

## 2021-03-14 NOTE — PROGRESS NOTES
Pt is admitted to unit from ED. Pt alert and orient to person only. VSS. Bed in lowest position, call light and belongings within reach. Patient education folder given and reviewed. Safety protocols and unit activities (VS, meds, rounding, etc.) explained to patient/family. White board updated. No further questions or needs stated at this time. Instructed to call with any needs.   Electronically signed by Faith Felix RN on 3/14/2021 at 5:20 PM

## 2021-03-14 NOTE — ED PROVIDER NOTES
[id number] [facility] RESIDENT NOTE       Date of evaluation: 3/14/2021    Chief Complaint     Seizures and Chest Pain      History of Present Illness     Isac Valiente is a 80 y.o. male PMH CAD, dementia, HLD, who presents from [facility] by EMS for concern of seizure and chest pain. Per patient, he had a normal morning. He had his usual breakfast, then was sitting and talking with two friends. The nurses then told him he had to come to the hospital. He denies any recent chest pain, abdominal pain, or difficulty breathing. Per collateral from [facility], patient was sitting in a chair when he suddenly had a blank stare, was shaking both arms and head. He dropped an item, but did not fall or hit head. The episode lasted about one minute. He was more confused for a couple minutes after the episode, but quickly improved. He returned to baseline mental status before being sent over. On valproate 125mg TID for mood disorder. Of note, patient finished treatment for pneumonia two weeks ago. Review of Systems     Review of Systems   Unable to perform ROS: Dementia       Past Medical, Surgical, Family, and Social History     He has a past medical history of Allergic rhinitis, Back pain, CAD (coronary artery disease), Dementia ([address] and Hyperlipidemia. He has a past surgical history that includes joint replacement (Bilateral, 1994); Cholecystectomy; Appendectomy; Tonsillectomy; and Coronary angioplasty with stent (05/2017). His family history is not on file. He reports that he quit smoking about 56 years ago. He has never used smokeless tobacco. He reports current alcohol use of about 1.0 standard drinks of alcohol per week. He reports that he does not use drugs.     Medications     Previous Medications    ACETAMINOPHEN (TYLENOL) 325 MG TABLET    Take 650 mg by mouth every 6 hours as needed for Pain    ALUMINUM & MAGNESIUM HYDROXIDE-SIMETHICONE (MAALOX) 796-380-04 MG/5ML SUSP SUSPENSION    Take 30 mLs by mouth every 4 hours as needed for Indigestion    ATORVASTATIN (LIPITOR) 40 MG TABLET    TAKE ONE TABLET BY MOUTH ONCE NIGHTLY    CLOPIDOGREL (PLAVIX) 75 MG TABLET    Take 75 mg by mouth daily    DIVALPROEX (DEPAKOTE) 250 MG DR TABLET    Take 250 mg by mouth nightly    DONEPEZIL (ARICEPT) 5 MG TABLET    TAKE ONE TABLET BY MOUTH ONCE NIGHTLY    MAGNESIUM HYDROXIDE (MILK OF MAGNESIA) 400 MG/5ML SUSPENSION    Take 30 mLs by mouth daily as needed for Constipation    METOPROLOL TARTRATE (LOPRESSOR) 25 MG TABLET    Take 25 mg by mouth daily    PANTOPRAZOLE (PROTONIX) 40 MG TABLET    1 tab po daily    POLYETHYLENE GLYCOL (GLYCOLAX) 17 G PACKET    Take 17 g by mouth daily    RISPERIDONE (RISPERDAL) 0.25 MG TABLET    Take 0.25 mg by mouth daily With dinner    TRAZODONE (DESYREL) 50 MG TABLET    Take 25 mg by mouth nightly    VITAMIN D (CHOLECALCIFEROL) 25 MCG (1000 UT) TABS TABLET    Take 5,000 Units by mouth daily       Allergies     He is allergic to pcn [penicillins]. Physical Exam     INITIAL VITALS: BP: 125/66, Temp: 97.7 °F (36.5 °C), Pulse: 60, Resp: 14, SpO2: 96 %   Physical Exam  Constitutional:       Appearance: Normal appearance. HENT:      Head: Normocephalic and atraumatic. Right Ear: External ear normal.      Left Ear: External ear normal.      Nose: Nose normal.      Mouth/Throat:      Mouth: Mucous membranes are moist.      Pharynx: Oropharynx is clear. Eyes:      Extraocular Movements: Extraocular movements intact. Conjunctiva/sclera: Conjunctivae normal.   Neck:      Musculoskeletal: Normal range of motion and neck supple. Cardiovascular:      Rate and Rhythm: Normal rate and regular rhythm. Pulses: Normal pulses. Pulmonary:      Effort: Pulmonary effort is normal. No respiratory distress. Breath sounds: Normal breath sounds. Abdominal:      General: Abdomen is flat. There is no distension.       Palpations: Abdomen Neutrophils Absolute 5.3 1.7 - 7.7 K/uL    Lymphocytes Absolute 1.3 1.0 - 5.1 K/uL    Monocytes Absolute 0.8 0.0 - 1.3 K/uL    Eosinophils Absolute 0.3 0.0 - 0.6 K/uL    Basophils Absolute 0.0 0.0 - 0.2 K/uL   Basic Metabolic Panel w/ Reflex to MG   Result Value Ref Range    Sodium 136 136 - 145 mmol/L    Potassium reflex Magnesium 4.4 3.5 - 5.1 mmol/L    Chloride 102 99 - 110 mmol/L    CO2 29 21 - 32 mmol/L    Anion Gap 5 3 - 16    Glucose 96 70 - 99 mg/dL    BUN 25 (H) 7 - 20 mg/dL    CREATININE 1.1 0.8 - 1.3 mg/dL    GFR Non-African American >60 >60    GFR African American >60 >60    Calcium 9.5 8.3 - 10.6 mg/dL   Urinalysis, reflex to microscopic   Result Value Ref Range    Color, UA Yellow Straw/Yellow    Clarity, UA Clear Clear    Glucose, Ur Negative Negative mg/dL    Bilirubin Urine Negative Negative    Ketones, Urine Negative Negative mg/dL    Specific Gravity, UA 1.020 1.005 - 1.030    Blood, Urine Negative Negative    pH, UA 7.5 5.0 - 8.0    Protein, UA TRACE (A) Negative mg/dL    Urobilinogen, Urine 0.2 <2.0 E.U./dL    Nitrite, Urine Negative Negative    Leukocyte Esterase, Urine Negative Negative    Microscopic Examination YES     Urine Type Voided    Brain Natriuretic Peptide   Result Value Ref Range    Pro- (H) 0 - 449 pg/mL   Troponin   Result Value Ref Range    Troponin 0.03 (H) <0.01 ng/mL   Magnesium   Result Value Ref Range    Magnesium 2.20 1.80 - 2.40 mg/dL   Troponin (lab)   Result Value Ref Range    Troponin 0.03 (H) <0.01 ng/mL   D-dimer, quantitative (Lab)   Result Value Ref Range    D-Dimer, Quant 850 (H) 0 - 229 ng/mL DDU   Microscopic Urinalysis   Result Value Ref Range    Mucus, UA 1+ (A) None Seen /LPF    WBC, UA 0-2 0 - 5 /HPF    RBC, UA 3-4 0 - 4 /HPF    Epithelial Cells, UA 2-5 0 - 5 /HPF       ED BEDSIDE ULTRASOUND:  None    RECENT VITALS:  BP: (!) 151/81, Temp: 97.7 °F (36.5 °C), Pulse: 54,Resp: 12, SpO2: 99 %     Procedures     None    ED Course     Nursing Notes, Past

## 2021-03-14 NOTE — PROGRESS NOTES
Pt a&o x 1 to person. Not able to console or follow commands. Keeps attempting to get out of bed to urinate. Bladder scan was zero. Pt not able to stay in bed, is a fall risk and has camera at bedside but not following commands. Pt getting frustrated when told by nurse to ask for assistance before getting out of bed and keeps attempting to remove tele monitor and threatening nurse when attempting to put it back on. MD notified. Will continue to monitor.    Electronically signed by Deshawn Castillo RN on 3/14/2021 at 6:30 PM

## 2021-03-14 NOTE — ED NOTES
Report called to RN on floor.      Luna Verdugo RN  03/14/21 1 [facility]  03/14/21 [id number]

## 2021-03-14 NOTE — PROGRESS NOTES
4 Eyes Admission Assessment     I agree as the admission nurse that 2 RN's have performed a thorough Head to Toe Skin Assessment on the patient. ALL assessment sites listed below have been assessed on admission. Areas assessed by both nurses:   [x]   Head, Face, and Ears   [x]   Shoulders, Back, and Chest  [x]   Arms, Elbows, and Hands   [x]   Coccyx, Sacrum, and Ischium  [x]   Legs, Feet, and Heels        Does the Patient have Skin Breakdown?   No         Arik Prevention initiated:  No   Wound Care Orders initiated:  No      [facility] nurse consulted for Pressure Injury (Stage 3,4, Unstageable, DTI, NWPT, and Complex wounds) or Arik score 18 or lower:  No      Nurse 1 eSignature: Electronically signed by Mariann Morris RN on 3/14/21 at 5:45 PM EDT    **SHARE this note so that the co-signing nurse is able to place an eSignature**    Nurse 2 eSignature: Electronically signed by Lili Allison RN on 3/14/21 at 9:42 PM EDT

## 2021-03-14 NOTE — PROGRESS NOTES
List of Home Medications the patient is currently taking is complete. Home Medication list in EPIC updated to reflect changes noted below. Source of medications in list is [facility] medications list.      Medications added:   Acetaminophen   Clopidrogel   Divalproex   Mag-Al-Plus   Metoprolol tartrate   Milk of Magnesia   Polyethylene glycol   Risperidone   Trazodone   Vitamin D    Medications removed:   Escitalopram   Metoprolol succinate   Ondansetron   Ticagrilor    Medications adjusted:   None    Please call with questions.   Memo Fernandes, PharmD, BCPS  Main pharmacy: [id number]  3/14/2021 2:56 PM

## 2021-03-14 NOTE — PROGRESS NOTES
Spoke with son, Aries Richards [place]. Updated on patient status and plan of care. States pt has had COVID in the past and has been vaccinated. All questions answered. Instructed to call with any needs or concerns. Will continue to monitor.   Electronically signed by Tin Clayton RN on 3/14/2021 at 5:45 PM

## 2021-03-15 ENCOUNTER — APPOINTMENT (OUTPATIENT)
Dept: MRI IMAGING | Age: 86
DRG: 101 | End: 2021-03-15
Payer: MEDICARE

## 2021-03-15 ENCOUNTER — APPOINTMENT (OUTPATIENT)
Dept: GENERAL RADIOLOGY | Age: 86
DRG: 101 | End: 2021-03-15
Payer: MEDICARE

## 2021-03-15 LAB
ALBUMIN SERPL-MCNC: 3.7 G/DL (ref 3.4–5)
ALP BLD-CCNC: 347 U/L (ref 40–129)
ALT SERPL-CCNC: 10 U/L (ref 10–40)
ANION GAP SERPL CALCULATED.3IONS-SCNC: 7 MMOL/L (ref 3–16)
AST SERPL-CCNC: 18 U/L (ref 15–37)
BASOPHILS ABSOLUTE: 0 K/UL (ref 0–0.2)
BASOPHILS RELATIVE PERCENT: 0.5 %
BILIRUB SERPL-MCNC: 0.4 MG/DL (ref 0–1)
BILIRUBIN DIRECT: <0.2 MG/DL (ref 0–0.3)
BILIRUBIN, INDIRECT: ABNORMAL MG/DL (ref 0–1)
BUN BLDV-MCNC: 21 MG/DL (ref 7–20)
CALCIUM SERPL-MCNC: 9.7 MG/DL (ref 8.3–10.6)
CHLORIDE BLD-SCNC: 103 MMOL/L (ref 99–110)
CO2: 32 MMOL/L (ref 21–32)
CREAT SERPL-MCNC: 0.9 MG/DL (ref 0.8–1.3)
EKG ATRIAL RATE: 53 BPM
EKG DIAGNOSIS: NORMAL
EKG P AXIS: 45 DEGREES
EKG P-R INTERVAL: 160 MS
EKG Q-T INTERVAL: 452 MS
EKG QRS DURATION: 84 MS
EKG QTC CALCULATION (BAZETT): 424 MS
EKG R AXIS: 46 DEGREES
EKG T AXIS: 57 DEGREES
EKG VENTRICULAR RATE: 53 BPM
EOSINOPHILS ABSOLUTE: 0.3 K/UL (ref 0–0.6)
EOSINOPHILS RELATIVE PERCENT: 4.5 %
GFR AFRICAN AMERICAN: >60
GFR NON-AFRICAN AMERICAN: >60
GLUCOSE BLD-MCNC: 79 MG/DL (ref 70–99)
HCT VFR BLD CALC: 39.3 % (ref 40.5–52.5)
HEMOGLOBIN: 12.9 G/DL (ref 13.5–17.5)
LACTIC ACID: 1.1 MMOL/L (ref 0.4–2)
LYMPHOCYTES ABSOLUTE: 1.7 K/UL (ref 1–5.1)
LYMPHOCYTES RELATIVE PERCENT: 23.3 %
MAGNESIUM: 2.2 MG/DL (ref 1.8–2.4)
MCH RBC QN AUTO: 32.1 PG (ref 26–34)
MCHC RBC AUTO-ENTMCNC: 32.9 G/DL (ref 31–36)
MCV RBC AUTO: 97.5 FL (ref 80–100)
MONOCYTES ABSOLUTE: 0.9 K/UL (ref 0–1.3)
MONOCYTES RELATIVE PERCENT: 11.6 %
NEUTROPHILS ABSOLUTE: 4.4 K/UL (ref 1.7–7.7)
NEUTROPHILS RELATIVE PERCENT: 60.1 %
PDW BLD-RTO: 14.3 % (ref 12.4–15.4)
PLATELET # BLD: 148 K/UL (ref 135–450)
PMV BLD AUTO: 7.6 FL (ref 5–10.5)
POTASSIUM REFLEX MAGNESIUM: 3.5 MMOL/L (ref 3.5–5.1)
RBC # BLD: 4.03 M/UL (ref 4.2–5.9)
SARS-COV-2, PCR: DETECTED
SODIUM BLD-SCNC: 142 MMOL/L (ref 136–145)
TOTAL CK: 57 U/L (ref 39–308)
TOTAL PROTEIN: 6.6 G/DL (ref 6.4–8.2)
WBC # BLD: 7.4 K/UL (ref 4–11)

## 2021-03-15 PROCEDURE — 82550 ASSAY OF CK (CPK): CPT

## 2021-03-15 PROCEDURE — 6360000004 HC RX CONTRAST MEDICATION: Performed by: INTERNAL MEDICINE

## 2021-03-15 PROCEDURE — 80048 BASIC METABOLIC PNL TOTAL CA: CPT

## 2021-03-15 PROCEDURE — 6360000002 HC RX W HCPCS: Performed by: STUDENT IN AN ORGANIZED HEALTH CARE EDUCATION/TRAINING PROGRAM

## 2021-03-15 PROCEDURE — 6370000000 HC RX 637 (ALT 250 FOR IP): Performed by: INTERNAL MEDICINE

## 2021-03-15 PROCEDURE — 36415 COLL VENOUS BLD VENIPUNCTURE: CPT

## 2021-03-15 PROCEDURE — 80076 HEPATIC FUNCTION PANEL: CPT

## 2021-03-15 PROCEDURE — 97116 GAIT TRAINING THERAPY: CPT

## 2021-03-15 PROCEDURE — 70553 MRI BRAIN STEM W/O & W/DYE: CPT

## 2021-03-15 PROCEDURE — 74018 RADEX ABDOMEN 1 VIEW: CPT

## 2021-03-15 PROCEDURE — 2060000000 HC ICU INTERMEDIATE R&B

## 2021-03-15 PROCEDURE — 97166 OT EVAL MOD COMPLEX 45 MIN: CPT

## 2021-03-15 PROCEDURE — 97535 SELF CARE MNGMENT TRAINING: CPT

## 2021-03-15 PROCEDURE — 2580000003 HC RX 258: Performed by: STUDENT IN AN ORGANIZED HEALTH CARE EDUCATION/TRAINING PROGRAM

## 2021-03-15 PROCEDURE — 97530 THERAPEUTIC ACTIVITIES: CPT

## 2021-03-15 PROCEDURE — 83735 ASSAY OF MAGNESIUM: CPT

## 2021-03-15 PROCEDURE — 85025 COMPLETE CBC W/AUTO DIFF WBC: CPT

## 2021-03-15 PROCEDURE — 83605 ASSAY OF LACTIC ACID: CPT

## 2021-03-15 PROCEDURE — 6370000000 HC RX 637 (ALT 250 FOR IP): Performed by: STUDENT IN AN ORGANIZED HEALTH CARE EDUCATION/TRAINING PROGRAM

## 2021-03-15 PROCEDURE — 97161 PT EVAL LOW COMPLEX 20 MIN: CPT

## 2021-03-15 PROCEDURE — A9579 GAD-BASE MR CONTRAST NOS,1ML: HCPCS | Performed by: INTERNAL MEDICINE

## 2021-03-15 RX ORDER — DIVALPROEX SODIUM 500 MG/1
500 TABLET, DELAYED RELEASE ORAL NIGHTLY
Status: DISCONTINUED | OUTPATIENT
Start: 2021-03-15 | End: 2021-03-16

## 2021-03-15 RX ORDER — LORAZEPAM 2 MG/ML
1 INJECTION INTRAMUSCULAR ONCE
Status: COMPLETED | OUTPATIENT
Start: 2021-03-15 | End: 2021-03-15

## 2021-03-15 RX ORDER — SODIUM CHLORIDE 9 MG/ML
INJECTION, SOLUTION INTRAVENOUS CONTINUOUS
Status: DISCONTINUED | OUTPATIENT
Start: 2021-03-15 | End: 2021-03-16 | Stop reason: HOSPADM

## 2021-03-15 RX ADMIN — ENOXAPARIN SODIUM 40 MG: 40 INJECTION SUBCUTANEOUS at 09:40

## 2021-03-15 RX ADMIN — ATORVASTATIN CALCIUM 40 MG: 40 TABLET, FILM COATED ORAL at 21:38

## 2021-03-15 RX ADMIN — ONDANSETRON 4 MG: 2 INJECTION INTRAMUSCULAR; INTRAVENOUS at 09:40

## 2021-03-15 RX ADMIN — LORAZEPAM 1 MG: 2 INJECTION INTRAMUSCULAR; INTRAVENOUS at 14:34

## 2021-03-15 RX ADMIN — Medication 10 ML: at 21:38

## 2021-03-15 RX ADMIN — Medication 10 ML: at 10:02

## 2021-03-15 RX ADMIN — DIVALPROEX SODIUM 500 MG: 500 TABLET, DELAYED RELEASE ORAL at 21:38

## 2021-03-15 RX ADMIN — TRAZODONE HYDROCHLORIDE 25 MG: 50 TABLET ORAL at 21:38

## 2021-03-15 RX ADMIN — SODIUM CHLORIDE: 9 INJECTION, SOLUTION INTRAVENOUS at 09:55

## 2021-03-15 RX ADMIN — GADOTERIDOL 18 ML: 279.3 INJECTION, SOLUTION INTRAVENOUS at 15:36

## 2021-03-15 ASSESSMENT — PAIN SCALES - PAIN ASSESSMENT IN ADVANCED DEMENTIA (PAINAD)
BREATHING: 0
FACIALEXPRESSION: 0
BODYLANGUAGE: 0
FACIALEXPRESSION: 0
NEGVOCALIZATION: 0
CONSOLABILITY: 0
FACIALEXPRESSION: 0
BREATHING: 0
CONSOLABILITY: 0
TOTALSCORE: 0
BODYLANGUAGE: 0
TOTALSCORE: 0
BODYLANGUAGE: 0
BREATHING: 0
BREATHING: 0
NEGVOCALIZATION: 0
TOTALSCORE: 0
NEGVOCALIZATION: 0
BODYLANGUAGE: 0
BREATHING: 0
CONSOLABILITY: 0
FACIALEXPRESSION: 0

## 2021-03-15 ASSESSMENT — PAIN SCALES - GENERAL: PAINLEVEL_OUTOF10: 0

## 2021-03-15 NOTE — CARE COORDINATION
prescription(s)  4. Cost of Rx cannot be added to hospital bill. If financial assistance is needed, please contact unit  or ;  or  CANNOT provide pharmacy voucher for patients co-pays  5.  Patients can then  the prescription on their way out of the hospital at discharge, or pharmacy can deliver to the bedside if staff is available. (payment due at time of pick-up or delivery - cash, check, or card accepted)     Able to afford home medications/ co-pay costs: Yes    ADLS  Support Systems: Children, ECF/Assisted Living    PT AM-PAC: 17 /24  OT AM-PAC:   /24    [place]: from [facility]icki Santana at [facility]  Steps: 0    Plans to RETURN to current housing: Yes  Barriers to RETURNING to current housing: none    [address] 78  Currently ACTIVE with [address]: No  Home Care Agency: Not Applicable    Currently ACTIVE with [place] on Aging: No  Passport/ Waiver: No  Passport/ Waiver Services: Not Applicable    :  Phone:       [address]  [facility] [place]: has at facility prior to admission  Equipment: cane    Home Oxygen and [address] prior to admission: No  [address]: Not Applicable  Other Respiratory Equipment:     Informed of need to bring portable home O2 tank on day of DISCHARGE for nursing to connect prior to leaving: No  Verbalized agreement/Understanding: No  Person to bring portable tank at discharge:     Dialysis  Active with HD/PD prior to admission: No  Nephrologist:     HD Center:  Not Applicable    DISCHARGE PLAN:  Disposition: [facility] ([facility]): [facility]  Phone: [phone]  Fax: [phone]    Transportation PLAN for discharge: EMS transportation     Factors facilitating achievement of predicted outcomes: Family support, Caregiver support and Has needed Durable Medical Equipment at home    Barriers to discharge: Medication managment    Additional

## 2021-03-15 NOTE — CONSULTS
Neurology consult Note    Dr. Yamileth Zamora requesting this consult. CC: Spell concerning for seizure    HPI:     Mr. Lissa Cisneros is an 80year old man with PMH CAD, HLD, dementia (with behavioral disturbance) who presented to ED with spell concerning for seizure. He lives in a locked dementia unit where he has been since October 2020 after [facility] admission. He is confused at baseline but rarely has agitation now. He is on aricept 10 mg daiy, depakote 250mg TID, and risperadal 0.4mg QHS. He was eating breakfast and he had a sudden blank stare, shaking of head and bilateral arms. More confused after event.      Past Medical History:   Diagnosis Date    Allergic rhinitis     Back pain     CAD (coronary artery disease)     Dementia ([facility] [address]     Hyperlipidemia      Past Surgical History:   Procedure Laterality Date    APPENDECTOMY      CHOLECYSTECTOMY      CORONARY ANGIOPLASTY WITH STENT PLACEMENT  05/2017    Stent X 1    JOINT REPLACEMENT Bilateral 1994    TONSILLECTOMY         CURRENT MEDICATIONS:    Current Facility-Administered Medications:     divalproex (DEPAKOTE) DR tablet 500 mg, 500 mg, Oral, Nightly, Polly Reeder MD    0.9 % sodium chloride infusion, , Intravenous, Continuous, Elizabeth Goetz MD, Last Rate: 75 mL/hr at 03/15/21 0955, New Bag at 03/15/21 0955    atorvastatin (LIPITOR) tablet 40 mg, 40 mg, Oral, Nightly, Elizabeth Goetz MD    clopidogrel (PLAVIX) tablet 75 mg, 75 mg, Oral, Daily, Elizabeth Goetz MD, Stopped at 03/15/21 1002    metoprolol tartrate (LOPRESSOR) tablet 25 mg, 25 mg, Oral, Daily, Elizabeth Goetz MD, Stopped at 03/15/21 0944    pantoprazole (PROTONIX) tablet 40 mg, 40 mg, Oral, QAM AC, Elizabeth Goetz MD, Stopped at 03/15/21 1002    traZODone (DESYREL) tablet 25 mg, 25 mg, Oral, Nightly, Elizabeth Goetz MD    Vitamin D (CHOLECALCIFEROL) tablet 5,000 Units, 5,000 Units, Oral, Daily, Elizabeth Goetz MD, Stopped at 03/15/21 1002    sodium chloride flush 0.9 % injection 10 mL, 10 mL, Intravenous, 2 times per day, Balbir Cardona MD, 10 mL at 03/15/21 1002    sodium chloride flush 0.9 % injection 10 mL, 10 mL, Intravenous, PRN, Balbir Cardona MD    promethazine (PHENERGAN) tablet 12.5 mg, 12.5 mg, Oral, Q6H PRN **OR** ondansetron (ZOFRAN) injection 4 mg, 4 mg, Intravenous, Q6H PRN, Balbir Cardona MD, 4 mg at 03/15/21 0940    polyethylene glycol (GLYCOLAX) packet 17 g, 17 g, Oral, Daily PRN, Balbir Cardona MD    acetaminophen (TYLENOL) tablet 650 mg, 650 mg, Oral, Q6H PRN **OR** acetaminophen (TYLENOL) suppository 650 mg, 650 mg, Rectal, Q6H PRN, Balbir Cardona MD    enoxaparin (LOVENOX) injection 40 mg, 40 mg, Subcutaneous, Daily, Balbir Cardona MD, 40 mg at 03/15/21 0940    influenza quadrivalent split vaccine (FLUZONE;FLUARIX;FLULAVAL;AFLURIA) injection 0.5 mL, 0.5 mL, Intramuscular, Prior to discharge, Ernesto Byers MD      ROS:   RUSSELL    Constitutional  /69   Pulse (!) 48   Temp 97.3 °F (36.3 °C) (Oral)   Resp 18   Ht 6' (1.829 m)   Wt 178 lb (80.7 kg)   SpO2 96%   BMI 24.14 kg/m²     Patient was seen during Covid-19 pandemic and all efforts made to respect recommendations of social distancing and decrease PPE have been made. Unable to perform face to face examination of the patient. Exam taken from nurse. Oriented to self. Confused. No focal motor deficit. Images:  CT head wo contrast:   No acute intracranial abnormality. Right maxillary sinus disease with air-fluid level. CTPA:  1. No evidence of pulmonary embolus. 2. Patchy groundglass opacities in right middle lobe and bilateral lower lobes, likely relate to multifocal pneumonia. Recommended follow-up as indicated clinically. Assessment:  80year old man with dementia who presented with spell and chest pain.   He is certainly more at risk for seizure due to his history of demenita, however, he is covid+ and was found to be in atrial fibrillation, so it's difficult to say what his clinical event was. He is already on depakote, so this would be protective for seizure. Spell Semiology: blank stare, was both arms and head. Lasted one minute     Plan:  -Routine EEG as outpatient (covid +) and unlikely to   -MRI brain w/wo contrast  -He is already on depakote.  His notes from [facility] state that his is on 250 TID for behavior, but we have him on 500mg , [facility], pharmacy to perform med rec, please    Sonya Nicole, APRN-CNP  Neurology  [phone]

## 2021-03-15 NOTE — PROGRESS NOTES
Physical Therapy    Facility/Department: [place] [address] [facility]U  Initial Assessment and Treatment    NAME: Nydia Murdock  : 10/27/1932  MRN: [id number]    Date of Service: 3/15/2021    Discharge Recommendations:    Nydia Murdock scored a 17/24 on the AM-PAC short mobility form. Current research shows that an AM-PAC score of 17 or less is typically not associated with a discharge to the patient's home setting. Based on the patient's AM-PAC score and their current functional mobility deficits, it is recommended that the patient have 3-5 sessions per week of Physical Therapy at d/c to increase the patient's independence. Please see assessment section for further patient specific details. If patient discharges prior to next session this note will serve as a discharge summary. Please see below for the latest assessment towards goals. PT Equipment Recommendations  Equipment Needed: No    Assessment   Body structures, Functions, Activity limitations: Decreased functional mobility ; Decreased endurance;Decreased posture;Decreased balance  Assessment: Pt normally at [facility], has assist for ADL and mobility as needed. Plan is to return at [place]. Recommend continued therapies to maximize functional mobility, safety and independence  Treatment Diagnosis: Decreased functional mobility post seizure. PT Education: Transfer Training;PT Role;Plan of Care;Gait Training;General Safety  Patient Education: Pt will benefit from reinforcement  Barriers to Learning: cognition, Elem  REQUIRES PT FOLLOW UP: Yes  Activity Tolerance  Activity Tolerance: Patient limited by endurance; Patient Tolerated treatment well  Activity Tolerance: O2 sats 92-93% post activity       Patient Diagnosis(es): The encounter diagnosis was Chest pain, unspecified type. has a past medical history of Allergic rhinitis, Back pain, CAD (coronary artery disease), Dementia ([facility] [address] and Hyperlipidemia.    has a past surgical history that includes joint replacement (Bilateral, 1994); Cholecystectomy; Appendectomy; Tonsillectomy; and Coronary angioplasty with stent (05/2017). Restrictions  Position Activity Restriction  Other position/activity restrictions: up with assistance  Vision/Hearing  Vision: Within Functional Limits  Hearing: Exceptions to [facility]  Hearing Exceptions: Hard of hearing/hearing concerns     Subjective  General  Chart Reviewed: Yes  Additional Pertinent Hx: CAD, HLD, Coronary stent, back pain, dementia  Referring Practitioner: J Carlos  Diagnosis: Adm 3/14 from [facility] with seizure and chest pain. Initial troponin .03, Head CT neg,  CXR neg. Subjective  Subjective: Pt supine in bed upon PT entry, agreeable to working with PT  Pain Screening  Patient Currently in Pain: Denies  Vital Signs  Patient Currently in Pain: Denies       Orientation  Orientation  Overall Orientation Status: Impaired  Orientation Level: Oriented to person;Oriented to place(know he is in a hospital, uncertain which one. Unable to state month, year or situation)  Social/Functional History  Social/Functional History  Lives With: Alone  Type of Home: Facility([place])  Home Layout: One level  Home Access: Elevator, Level entry([facility])  Home Equipment: Cane  Ambulation Assistance: (with cane)  Additional Comments: pt is a poor historian, per chart review pt was living in [facility]; pt reporting having recent falls    Objective  AROM RLE (degrees)  RLE AROM: WFL  AROM LLE (degrees)  LLE AROM : WFL  Strength RLE  Strength RLE: WFL  Comment: grossly 4/5  Strength LLE  Strength LLE: WFL  Comment: grossly 4/5        Bed mobility  Supine to Sit: Minimal assistance(HOB elevated)  Transfers  Sit to Stand: Minimal Assistance(from eob and toilet)  Stand to sit: Minimal Assistance(to toilet, chair)  Ambulation  Ambulation?: Yes  Ambulation 1  Device: Rolling Walker  Assistance: Minimal assistance  Quality of Gait: forward bent over walker, decreased step length and height.   Distance: 8' x 2 Balance  Comments: Good static sitting balance on eob, One initial LOB posteriorly upon initial stance with use of spc.   Able to ambulate with RW and min assist with good stabiltiy        Plan   Plan  Times per week: 2-5  Times per day: Daily  Current Treatment Recommendations: Functional Mobility Training, Gait Training, Transfer Training, Safety Education & Training  Safety Devices  Type of devices: Nurse notified, Chair alarm in place, Call light within reach, Left in chair  Restraints  Initially in place: No          Goals  Short term goals  Time Frame for Short term goals: Discharge  Short term goal 1: Bed mobility with SBA  Short term goal 2: Transfer sit to stand with CG  Short term goal 3: Ambulate 40 ft with RW and CG with good stability  Patient Goals   Patient goals : not specifically stated       Therapy Time   Individual Concurrent Group Co-treatment   Time In 1100         Time Out 1145         Minutes 45               Timed Code Treatment Minutes:   30    Total Treatment Minutes:  [address] [facility], [id number]

## 2021-03-15 NOTE — PROGRESS NOTES
Internal Medicine PGY-1 Resident Progress Note        PCP: Kala Santamaria MD    Date of Admission: 3/14/2021    Chief Complaint: Seizures and Chest Pain        Interval History:   Pt seen and examined this morning. States 'I don't feel good' and is holding his abdomen. Reports nausea, abdominal pain and tenderness in all four abdominal quadrants, and chest pain. Is only oriented to name, but not oriented to age or place. Covid testing positive. Spoke over the phone with Tay, the nursing manager at the [facility], and she stated that the patient had Covid PNA around January although she was not able to find the records of the exact dates. She states that the patient received only his first dose of the Covid vaccine recently and was treated with Levaquin for a right lower lobe PNA starting on 2/23 last month. Hospital Course:   80 y.o. male with PMH of CAD, dementia, mood disorder (on depakote), hyperlipidemia, allergic rhinitis, back  pain who presented to [facility]. with Seizures and Chest Pain  Pt has dementia and is a poor historian due to pt's memory deficits. History was mostly obtained from pt's son Doug Rincon and from EMR. Per pt's son Doug Rincon pt has short term memory deficits and forgets events within 10 to 20 minutes. At the time of the encounter pt states he does not remember having had a seizure earlier and or having had chest pain. Pt's son states that he was not given much detail about the pt's seizure episode. Per ED notes pt comes from the [facility], where he had a one minute episode of sitting in a chair when he suddenly had a blank stare, was shaking both arms and head, dropped an item, but did not sustain a fall or hit his head. He was confused after the episode.  Per son Doug Mckenzie pt was more confused than usual in the ED and initially did not recognize the son, but he states that he has not been able to see the pt over this past year and as they started talking the pt did recognize him and asked about the other son as well. The son states that the pt does not have a history of seizures but had Covid a couple of months ago and that he was treated for pneumonia 2 weeks ago as well. Pt's son states that in case of a cardiac arrest he would not like the pt to be resuscitated and would not like the pt to be intubated in case of respiratory worsening. Pt complained of chest pain in the ED but denied chest pain during this encounter.     ED course: Initial troponin elevated to 0.03.  BNP elevated.  Chest x-ray unremarkable.  CT head unremarkable.  D dimer positive, age adjusted.  CTPA shows no acute PE, but concern for groundglass opacities. Per EMS, patient's EKG read as a fib. Per ED on review of EKG performed by EMS, patient was in sinus bradycardia with significant artifact. P waves seen in lead III. Medications:  Reviewed    Infusion Medications   Scheduled Medications    divalproex  500 mg Oral Nightly    atorvastatin  40 mg Oral Nightly    clopidogrel  75 mg Oral Daily    metoprolol tartrate  25 mg Oral Daily    pantoprazole  40 mg Oral QAM AC    traZODone  25 mg Oral Nightly    Vitamin D  5,000 Units Oral Daily    sodium chloride flush  10 mL Intravenous 2 times per day    enoxaparin  40 mg Subcutaneous Daily    influenza virus vaccine  0.5 mL Intramuscular Prior to discharge     PRN Meds: sodium chloride flush, promethazine **OR** ondansetron, polyethylene glycol, acetaminophen **OR** acetaminophen      Intake/Output Summary (Last 24 hours) at 3/15/2021 0834  Last data filed at 3/15/2021 0150  Gross per 24 hour   Intake 0 ml   Output 550 ml   Net -550 ml       Physical Exam Performed:    /70   Pulse 56   Temp 98 °F (36.7 °C) (Oral)   Resp 18   Ht 6' (1.829 m)   Wt 178 lb (80.7 kg)   SpO2 96%   BMI 24.14 kg/m²     General appearance: No apparent distress, appears stated age and cooperative. HEENT: Pupils equal, round, and reactive to light. Conjunctivae/corneas clear. Neck: Supple, with full range of motion. No jugular venous distention. Trachea midline. Respiratory:  Normal respiratory effort. Clear to auscultation, bilaterally without Rales/Wheezes/Rhonchi. Cardiovascular: Regular rate and rhythm with normal S1/S2 without murmurs, rubs or gallops. Abdomen: Tender to palpation in all four quadrants. Soft, non-distended with normal bowel sounds. Musculoskeletal: Trace edema in lower extremities. No clubbing, cyanosis bilaterally. Full range of motion without deformity. Skin: Skin color, texture, turgor normal.  No rashes or lesions. Neurologic:  Neurovascularly intact without any focal sensory/motor deficits. Cranial nerves: II-XII intact, grossly non-focal.  Psychiatric: Oriented to person only. Alert, thought content appropriate, normal insight  Capillary Refill: Brisk,< 3 seconds   Peripheral Pulses: +2 palpable, equal bilaterally       Labs:   Recent Labs     03/14/21  1143 03/15/21  0626   WBC 7.8 7.4   HGB 12.1* 12.9*   HCT 37.1* 39.3*    148     Recent Labs     03/14/21  1143 03/15/21  0626    142   K 4.4 3.5    103   CO2 29 32   BUN 25* 21*   CREATININE 1.1 0.9   CALCIUM 9.5 9.7     Recent Labs     03/15/21  0626   AST 18   ALT 10   BILIDIR <0.2   BILITOT 0.4   ALKPHOS 347*     No results for input(s): INR in the last 72 hours. Recent Labs     03/14/21  1143 03/14/21  1339 03/14/21  2009 03/15/21  0626   CKTOTAL  --   --   --  57   TROPONINI 0.03* 0.03* 0.02*  --        Urinalysis:      Lab Results   Component Value Date    NITRU Negative 03/14/2021    WBCUA 0-2 03/14/2021    RBCUA 3-4 03/14/2021    BLOODU Negative 03/14/2021    SPECGRAV 1.020 03/14/2021    GLUCOSEU Negative 03/14/2021       Radiology:  CT CHEST PULMONARY EMBOLISM W CONTRAST   Final Result      1. No evidence of pulmonary embolus.       2. Patchy groundglass opacities in right middle lobe and bilateral lower lobes, likely relate to multifocal pneumonia. Recommended follow-up as indicated clinically. CT Head WO Contrast   Final Result      No acute intracranial abnormality. Right maxillary sinus disease with air-fluid level. XR CHEST PORTABLE   Final Result      Bibasilar mild streaky opacities likely relate to atelectasis. MRI BRAIN W WO CONTRAST    (Results Pending)           Assessment/Plan:    Harry Luis, 80 y.o. male who presented with Seizures and Chest Pain    Active Hospital Problems    Diagnosis Date Noted    Seizure [facility]) [R56.9] 03/14/2021     Seizure - pt comes from the [facility], where he had a one minute episode of sitting in a chair when he suddenly had a blank stare, was shaking both arms and head, dropped an item, but did not sustain a fall or hit his head. He was confused after the episode but seemed to be at baseline in the ED. Pt's son states that pt has no hx of seizures. Pt's son states that pt had Covid a few months ago. - seizure precautions  - BMP, CBC unremarkable  - UA negative for infection  - CT head negative for hemorrhage  - Neurology team consulted, appreciate recs  - CT chest showing patchy groundglass opacities in right middle lobe and bilateral lower lobes, likely relate to multifocal pneumonia - likely residual changes from recent Covid infection  - repeat interval CXR in 6 wks for resolution of opacities  - procalcitonin 0.07  - f/u spot EEG  - hx MRI abdomen 10/2018 showing mass lesion at the inferior pole right kidney suspicious of renal cell carcinoma or oncocytoma; US renal 4/2019 showing solid nodule in right kidney   - f/u brain MRI w/ and w/o contrast to evaluate for seizure focus  - started fluids - NS IV at 75ml/hr     Chest pain - Per EMS, patient's EKG read as a fib. On review of EKG performed by EMS, patient is in sinus bradycardia with significant artifact. P waves seen in lead III. Pt has trace edema in lower extremities.   - EKG sinus bradycardia, premature atrial complexes  - troponin 0.03 x2 --> 0.02  - proBNP 579  - d-dimer 850  - f/u limited ECHO for trace edema in lower extremities  - lactic acid 1.1  - telemetry monitoring  - CK 57, ALK phos 347, ALT/AST wnl     CAD  - plavix, statin  - f/u ECHO     Covid positive -  had Covid PNA around January and has received one dose of the Covid vaccine  - Covid testing positive  - droplet plus isolation     Mood disorder  - depakote  - trazodone     Hx dementia  - continue risperidone     HTN  - continue lopressor             DVT Prophylaxis: Lovenox  Diet: DIET CARDIAC;  Code Status: Limited  PT/OT Eval Status: ordered  Dispo - GMF      I will discuss the patient with the senior resident and MD Jesica Sumner MD   Internal Medicine Resident PGY-1  03/15/21     Patient seen and examined, labs and imaging studies reviewed, agree with assessment and plan as outlined above. Continue with current care and plan. Discussed case with patients nurse, discussed case with care team, discussed plan.       MD Endy Fonseca

## 2021-03-15 NOTE — PROGRESS NOTES
Occupational Therapy   Occupational Therapy Initial Assessment/ Treatment  Date: 3/15/2021   Patient Name: Stanton Gibbons  MRN: [id number]     : 10/27/1932    Date of Service: 3/15/2021    Discharge Recommendations:     OT Equipment Recommendations  Equipment Needed: No  Other: defer    Assessment   Performance deficits / Impairments: Decreased functional mobility ; Decreased endurance;Decreased ADL status; Decreased balance;Decreased strength;Decreased high-level IADLs  Assessment: Prior to admission pt was living in [facility], was independent with ADLs and staff assisting with IADLs. Pt was independent with mobility using his SC. pt now presents below baseline, requiring min A for transfers and mobility/ ADLs. Pt would benefit from continued OT while in acute care, AMPAC score indicaes non homebound discharge. Continue OT POC. Treatment Diagnosis: decreased ADLs and transfers secondary to COVID  Prognosis: Fair  Decision Making: Medium Complexity  OT Education: OT Role;Plan of Care  Patient Education: verb understanding  REQUIRES OT FOLLOW UP: Yes  Activity Tolerance  Activity Tolerance: Patient limited by fatigue  Activity Tolerance: Pt demonstrated MCINTYRE, however O2 sats 92-93% on RA after mobility in room  Safety Devices  Safety Devices in place: Yes  Type of devices: Left in chair;Nurse notified;Call light within reach; Chair alarm in place           Patient Diagnosis(es): The encounter diagnosis was Chest pain, unspecified type. has a past medical history of Allergic rhinitis, Back pain, CAD (coronary artery disease), Dementia ([facility] [address] and Hyperlipidemia. has a past surgical history that includes joint replacement (Bilateral, ); Cholecystectomy; Appendectomy; Tonsillectomy; and Coronary angioplasty with stent (2017).     Treatment Diagnosis: decreased ADLs and transfers secondary to COVID      Restrictions  Position Activity Restriction  Other position/activity restrictions: up with assistance    Subjective General  Chart Reviewed: Yes  Additional Pertinent Hx: 80 y.o. male PMH CAD, dementia, HLD, who presents from [facility] by EMS for concern of seizure and chest pain. A-fib initially in ED. He returned to baseline mental status before being sent over. On valproate 125mg TID for mood disorder. Of note, patient finished treatment for pneumonia two weeks ago. Head CT (-) COVID (+) but has been vacinnated  Family / Caregiver Present: No  Referring Practitioner: Nataly Roberts MD  Diagnosis: seizure  Subjective  Subjective: Pt semi supine in bed upon arrival, agreeable to OT eval and treat.   Patient Currently in Pain: Denies  Vital Signs  Pulse: 56  Heart Rate Source: Monitor  Resp: 18  BP: 111/60  BP Location: Left upper arm  Patient Position: Up in chair  Level of Consciousness: Alert (0)  Patient Currently in Pain: Denies  Oxygen Therapy  SpO2: 96 %  O2 Device: None (Room air)  Social/Functional History  Social/Functional History  Lives With: Alone  Type of Home: Facility([place])  Home Layout: One level  Home Access: Elevator, Level entry([facility])  Home Equipment: Cane  Ambulation Assistance: (with cane)  Additional Comments: pt is a poor historian, per chart review pt was living in [facility]; pt reporting having recent falls       Objective        Orientation  Overall Orientation Status: Within Functional Limits     Balance  Sitting Balance: Stand by assistance  Standing Balance: Minimal assistance  Standing Balance  Time: 10 mins total  Activity: mobility into bathroom, standing at sink, mobility to bedside chair  Comment: with RW  Functional Mobility  Functional - Mobility Device: Rolling Walker  Activity: To/from bathroom  Assist Level: Minimal assistance  Toilet Transfers  Toilet - Technique: Ambulating  Equipment Used: Standard toilet  Toilet Transfer: Minimal assistance  ADL  Grooming: Minimal assistance(to CGA; standing at sink for oral care, washing hands- approximately 5-8 mins standing)  LE Dressing: Maximum assistance(threading brief, assist to thread catheter into LEs. Pt pulling up over hips with assist)  Toileting: Minimal assistance(simulated as pt did not need to void, has condom cath)           Transfers  Sit to stand: Minimal assistance  Stand to sit: Minimal assistance  Transfer Comments: upon initially standing during LB dressing/ pulling pants up standing at [facility], pt with posterior lean in stance requiring min A to recover. Pt noted with increased stabiltiy once using RW     Cognition  Overall Cognitive Status: Exceptions  Arousal/Alertness: Appropriate responses to stimuli  Following Commands: Follows one step commands with increased time  Attention Span: Attends with cues to redirect  Memory: Decreased recall of precautions  Safety Judgement: Decreased awareness of need for assistance  Problem Solving: Assistance required to generate solutions  Insights: Decreased awareness of deficits  Initiation: Requires cues for some  Sequencing: Requires cues for some                 LUE AROM (degrees)  LUE AROM : WFL  Left Hand AROM (degrees)  Left Hand AROM: WFL  RUE AROM (degrees)  RUE AROM : WFL  Right Hand AROM (degrees)  Right Hand AROM: WFL  LUE Strength  Gross LUE Strength: WFL  RUE Strength  Gross RUE Strength: WFL       Treatment included functional transfer training, ADLs, and patient education.                Plan   Plan  Times per week: 2-5  Times per day: Daily  Current Treatment Recommendations: Strengthening, Balance Training, Functional Mobility Training, Endurance Training, Self-Care / ADL, Neuromuscular Re-education    AM-PAC Score        AM-[facility] Inpatient Daily Activity Raw Score: 16 (03/15/21 1533)  AM-PAC Inpatient ADL T-Scale Score : 35.96 (03/15/21 1533)  ADL Inpatient CMS 0-100% Score: 53.32 (03/15/21 1533)  ADL Inpatient CMS G-Code Modifier : CK (03/15/21 1533)    Goals  Short term goals  Time Frame for Short term goals: by dc  Short term goal 1: Pt will complete LB dressing with SBA  Short term goal 2: Pt will complete standing level grooming with spv  Short term goal 3: Pt will complete BSC transfers with spv  Short term goal 4: Pt will complete B UE HEP x 15 reps to increase activity tolerance for ADLs. Patient Goals   Patient goals : to be able to go home       Therapy Time   Individual Concurrent Group Co-treatment   Time In 1100         Time Out 1140         Minutes 40         Timed Code Treatment Minutes: 25 Minutes(+15 min eval)     Harriett SHORE  [address] [facility], OTR/L [id number]

## 2021-03-15 NOTE — PROGRESS NOTES
Called pts son Cristal Lizarraga to give updates on pts condition and plan of care. Also notified Liliana Lozano that patient needed to be placed in soft bilateral wrist restraints due to aggression towards nurses and multiple attempts to get out of bed/risk of falling. Shabana Burton understanding at this time and does not have any questions or concerns at the moment.

## 2021-03-15 NOTE — PROGRESS NOTES
Pts son, Liliana Lozano, called and updated. Mri questionnaire completed and faxed. All questions answered at this time.

## 2021-03-15 NOTE — PLAN OF CARE
Problem: Falls - Risk of:  Goal: Will remain free from falls  Description: Will remain free from falls  3/15/2021 1028 by Nathanial Barthel, RN  Note: Pt free from injury or falls at this time, fall precautions in place, bed in low position, side rail up x2, [place] Fall Risk: High (45 and higher), bed alarm on, reoriented to room and call light, reminded not to get up without assistance, call light in reach, will continue to monitor. Problem: Pain:  Description: Pain management should include both nonpharmacologic and pharmacologic interventions. Goal: Pain level will decrease  Description: Pain level will decrease  3/15/2021 1028 by Nathanial Barthel, RN  Note: Pt verbalizing generalized pain in abdomen and chest this morning to MD. Unable to describe or localize pain. Appears comfortable in bed. Scores 0 on advanced dementia pain scale. Will continue to monitor. Problem: Injury - Risk of, Physical Injury:  Goal: Will remain free from falls  Description: Will remain free from falls  3/15/2021 1028 by Nathanial Barthel, RN  Note: Pt free from injury or falls at this time, fall precautions in place, bed in low position, side rail up x2, [place] Fall Risk: High (45 and higher), bed alarm on, reoriented to room and call light, reminded not to get up without assistance, call light in reach, will continue to monitor. 3/14/2021 2145 by Rogelio Otero RN  Outcome: Ongoing  Note: Pt with absence of falls this shift. All fall precautions in place. RN monitors frequently in the anticipation of needs. Will continue to monitor. Problem: Cardiac:  Goal: Hemodynamic stability will improve  Description: Hemodynamic stability will improve  Note: Pt remains hemodynamically stable at this time. Vss. Denies chest pain, sob, lightheadedness, dizziness, or palpitations. Strict I/Os maintained with daily weights. SB on tele. SpO2 remains >92% on room air . Will continue to monitor.         Problem: Isolation Precautions -

## 2021-03-15 NOTE — PROGRESS NOTES
Pt taken out of restraints at this time. Pt resting quietly in bed. Upon morning lab draw and assessment, pt very calm and cooperative. On call resident notified, restraint order discontinued at this time. Will continue to monitor and reassess.

## 2021-03-15 NOTE — PROGRESS NOTES
Per Eileen Villavicencio, EEG tech, pt is unable to get ordered EEG due to his positive covid result. Call placed to [facility], neuro np, to let her know.

## 2021-03-15 NOTE — ACP (ADVANCE CARE PLANNING)
Advance Care Planning     Advance Care Planning Activator (Inpatient)  Conversation Note      Date of ACP Conversation: 3/14/2021    Conversation Conducted with: Carmen wang    ACP Activator: [address] Decision Maker:     Current Designated Health Care Decision Maker:     Primary Decision Maker: Severiano Jewett - Child - [phone]    Secondary Decision Maker: Lora Mcdaniel Child - [phone]      Care Preferences    Ventilation: \"If you were in your present state of health and suddenly became very ill and were unable to breathe on your own, what would your preference be about the use of a ventilator (breathing machine) if it were available to you? \"      Would the patient desire the use of ventilator (breathing machine)?: no    \"If your health worsens and it becomes clear that your chance of recovery is unlikely, what would your preference be about the use of a ventilator (breathing machine) if it were available to you? \"     Would the patient desire the use of ventilator (breathing machine)?: no      Resuscitation  \"CPR works best to restart the heart when there is a sudden event, like a heart attack, in someone who is otherwise healthy. Unfortunately, CPR does not typically restart the heart for people who have serious health conditions or who are very sick. \"    \"In the event your heart stopped as a result of an underlying serious health condition, would you want attempts to be made to restart your heart (answer \"yes\" for attempt to resuscitate) or would you prefer a natural death (answer \"no\" for do not attempt to resuscitate)? \" no       [x] Yes   [] No   Educated Patient / [facility]  regarding differences between Advance Directives and portable DNR orders.     Length of ACP Conversation in minutes:  15    Conversation Outcomes:  [x] ACP discussion completed  [] Existing advance directive reviewed with patient; no changes to patient's previously recorded wishes  [] New Advance Directive completed  [] Portable Do Not Rescitate prepared for Provider review and signature  [] POLST/POST/MOLST/MOST prepared for Provider review and signature      Follow-up plan:    [] Schedule follow-up conversation to continue planning  [] Referred individual to Provider for additional questions/concerns   [] Advised patient/agent/surrogate to review completed ACP document and update if needed with changes in condition, patient preferences or care setting    [x] This note routed to one or more involved healthcare providers

## 2021-03-15 NOTE — PLAN OF CARE
Problem: Falls - Risk of:  Goal: Will remain free from falls  Description: Will remain free from falls  Outcome: Ongoing  Note: Pt with absence of falls this shift. All fall precautions in place. RN monitors frequently in the anticipation of needs. Will continue to monitor. Problem: Pain:  Goal: Pain level will decrease  Description: Pain level will decrease  Outcome: Ongoing  Note: Pt does not complain of pain this shift and rates pain 0/10. No intervention needed at this time. Will continue to monitor. Problem: Health Behavior:  Goal: Ability to manage health-related needs will improve  Description: Ability to manage health-related needs will improve  Outcome: Ongoing  Note: No seizure activity noted thus far this shift. All seizure precautions in place. Will continue to monitor. Problem: Non-Violent Restraints  Goal: No harm/injury to patient while restraints in use  Outcome: Ongoing  Note: Soft bilateral wrist restraints remain in place due to pts aggression towards nursing staff and multiple attempts getting out of bed. Restraints assessed q1-2 hours for continued need/assessment of extremities for injury/ROM activities. Will continue to monitor and reassess need for restraint use. Problem: Cardiac:  Goal: Ability to maintain an adequate cardiac output will improve  Description: Ability to maintain an adequate cardiac output will improve  Outcome: Ongoing  Note: Pt does not complain of chest pain thus far this shift. VSS. Will continue to monitor and reassess.

## 2021-03-15 NOTE — FLOWSHEET NOTE
03/15/21 1107   Encounter Summary   Services provided to: Family  (son [place] by phone)   Continue Visiting   (es 3/15 acp)   Complexity of Encounter High   Length of Encounter 15 minutes   Advance Care Planning Yes   acp conversation by phone with son, Tollie Apgar. Details in acp note.

## 2021-03-16 VITALS
HEIGHT: 72 IN | SYSTOLIC BLOOD PRESSURE: 95 MMHG | RESPIRATION RATE: 17 BRPM | WEIGHT: 178 LBS | BODY MASS INDEX: 24.11 KG/M2 | OXYGEN SATURATION: 96 % | DIASTOLIC BLOOD PRESSURE: 66 MMHG | HEART RATE: 77 BPM | TEMPERATURE: 98 F

## 2021-03-16 LAB
ALBUMIN SERPL-MCNC: 2.9 G/DL (ref 3.4–5)
ALP BLD-CCNC: 306 U/L (ref 40–129)
ALT SERPL-CCNC: 9 U/L (ref 10–40)
ANION GAP SERPL CALCULATED.3IONS-SCNC: 7 MMOL/L (ref 3–16)
AST SERPL-CCNC: 16 U/L (ref 15–37)
BASOPHILS ABSOLUTE: 0.1 K/UL (ref 0–0.2)
BASOPHILS RELATIVE PERCENT: 1.1 %
BILIRUB SERPL-MCNC: <0.2 MG/DL (ref 0–1)
BILIRUBIN DIRECT: <0.2 MG/DL (ref 0–0.3)
BILIRUBIN, INDIRECT: ABNORMAL MG/DL (ref 0–1)
BUN BLDV-MCNC: 29 MG/DL (ref 7–20)
CALCIUM SERPL-MCNC: 8.8 MG/DL (ref 8.3–10.6)
CHLORIDE BLD-SCNC: 105 MMOL/L (ref 99–110)
CO2: 29 MMOL/L (ref 21–32)
CREAT SERPL-MCNC: 1.2 MG/DL (ref 0.8–1.3)
EOSINOPHILS ABSOLUTE: 0.3 K/UL (ref 0–0.6)
EOSINOPHILS RELATIVE PERCENT: 4 %
GFR AFRICAN AMERICAN: >60
GFR NON-AFRICAN AMERICAN: 57
GLUCOSE BLD-MCNC: 119 MG/DL (ref 70–99)
HCT VFR BLD CALC: 36.8 % (ref 40.5–52.5)
HEMOGLOBIN: 12.1 G/DL (ref 13.5–17.5)
LYMPHOCYTES ABSOLUTE: 2 K/UL (ref 1–5.1)
LYMPHOCYTES RELATIVE PERCENT: 29.1 %
MCH RBC QN AUTO: 32.2 PG (ref 26–34)
MCHC RBC AUTO-ENTMCNC: 32.8 G/DL (ref 31–36)
MCV RBC AUTO: 98.2 FL (ref 80–100)
MONOCYTES ABSOLUTE: 0.7 K/UL (ref 0–1.3)
MONOCYTES RELATIVE PERCENT: 10.1 %
NEUTROPHILS ABSOLUTE: 3.8 K/UL (ref 1.7–7.7)
NEUTROPHILS RELATIVE PERCENT: 55.7 %
PDW BLD-RTO: 14.3 % (ref 12.4–15.4)
PLATELET # BLD: 132 K/UL (ref 135–450)
PMV BLD AUTO: 7.5 FL (ref 5–10.5)
POTASSIUM REFLEX MAGNESIUM: 4 MMOL/L (ref 3.5–5.1)
RBC # BLD: 3.74 M/UL (ref 4.2–5.9)
SODIUM BLD-SCNC: 141 MMOL/L (ref 136–145)
TOTAL PROTEIN: 5.7 G/DL (ref 6.4–8.2)
WBC # BLD: 6.9 K/UL (ref 4–11)

## 2021-03-16 PROCEDURE — 97110 THERAPEUTIC EXERCISES: CPT

## 2021-03-16 PROCEDURE — 80048 BASIC METABOLIC PNL TOTAL CA: CPT

## 2021-03-16 PROCEDURE — 36415 COLL VENOUS BLD VENIPUNCTURE: CPT

## 2021-03-16 PROCEDURE — 85025 COMPLETE CBC W/AUTO DIFF WBC: CPT

## 2021-03-16 PROCEDURE — 6360000002 HC RX W HCPCS: Performed by: STUDENT IN AN ORGANIZED HEALTH CARE EDUCATION/TRAINING PROGRAM

## 2021-03-16 PROCEDURE — 6370000000 HC RX 637 (ALT 250 FOR IP): Performed by: STUDENT IN AN ORGANIZED HEALTH CARE EDUCATION/TRAINING PROGRAM

## 2021-03-16 PROCEDURE — 80076 HEPATIC FUNCTION PANEL: CPT

## 2021-03-16 PROCEDURE — 97116 GAIT TRAINING THERAPY: CPT

## 2021-03-16 PROCEDURE — 2580000003 HC RX 258: Performed by: STUDENT IN AN ORGANIZED HEALTH CARE EDUCATION/TRAINING PROGRAM

## 2021-03-16 PROCEDURE — 6370000000 HC RX 637 (ALT 250 FOR IP): Performed by: NURSE PRACTITIONER

## 2021-03-16 RX ORDER — DIVALPROEX SODIUM 125 MG/1
250 CAPSULE, COATED PELLETS ORAL EVERY 8 HOURS SCHEDULED
Status: DISCONTINUED | OUTPATIENT
Start: 2021-03-16 | End: 2021-03-16 | Stop reason: HOSPADM

## 2021-03-16 RX ORDER — DIVALPROEX SODIUM 125 MG/1
250 CAPSULE, COATED PELLETS ORAL EVERY 8 HOURS SCHEDULED
Qty: 60 CAPSULE | Refills: 3 | Status: ON HOLD | OUTPATIENT
Start: 2021-03-16 | End: 2022-03-07

## 2021-03-16 RX ADMIN — Medication 5000 UNITS: at 08:00

## 2021-03-16 RX ADMIN — CLOPIDOGREL BISULFATE 75 MG: 75 TABLET ORAL at 08:00

## 2021-03-16 RX ADMIN — SODIUM CHLORIDE: 9 INJECTION, SOLUTION INTRAVENOUS at 02:18

## 2021-03-16 RX ADMIN — ENOXAPARIN SODIUM 40 MG: 40 INJECTION SUBCUTANEOUS at 07:59

## 2021-03-16 RX ADMIN — DIVALPROEX SODIUM 250 MG: 125 CAPSULE ORAL at 11:21

## 2021-03-16 RX ADMIN — PANTOPRAZOLE SODIUM 40 MG: 40 TABLET, DELAYED RELEASE ORAL at 06:42

## 2021-03-16 RX ADMIN — DIVALPROEX SODIUM 250 MG: 125 CAPSULE ORAL at 15:50

## 2021-03-16 ASSESSMENT — PAIN SCALES - PAIN ASSESSMENT IN ADVANCED DEMENTIA (PAINAD)
BODYLANGUAGE: 0
BREATHING: 0
CONSOLABILITY: 0
BODYLANGUAGE: 0
FACIALEXPRESSION: 0
BREATHING: 0
CONSOLABILITY: 0
TOTALSCORE: 0
FACIALEXPRESSION: 0
TOTALSCORE: 0
TOTALSCORE: 0
BREATHING: 0

## 2021-03-16 ASSESSMENT — PAIN SCALES - GENERAL
PAINLEVEL_OUTOF10: 0
PAINLEVEL_OUTOF10: 0

## 2021-03-16 NOTE — PROGRESS NOTES
Physical Therapy  Facility/Department: [place] [address] [facility]U  Daily Treatment Note  NAME: Jene Frankel  : 10/27/1932  MRN: [id number]    Date of Service: 3/16/2021    Discharge Recommendations:  Jene Frankel scored a 17/24 on the AM-PAC short mobility form. Current research shows that an AM-PAC score of 17 or less is typically not associated with a discharge to the patient's home setting. Based on the patient's AM-PAC score and their current functional mobility deficits, it is recommended that the patient have 3-5 sessions per week of Physical Therapy at d/c to increase the patient's independence.  Please see assessment section for further patient specific details.     If patient discharges prior to next session this note will serve as a discharge summary. Please see below for the latest assessment towards goals. PT Equipment Recommendations  Equipment Needed: No  Other: Pt reports he has RW, questionable historian    Assessment   Body structures, Functions, Activity limitations: Decreased functional mobility ; Decreased endurance;Decreased posture;Decreased balance  Assessment: Pt tolerated tx well this date. Pt able to ambulate further with less assist and good stability. Pt tolerated standing and seated ther ex with O2 sats within normal limits and no reports of SOB/lightheadedness. Pt requires seated rest breaks between bouts of gait and standing exercises but is not too limited by endurance. Pt would continue to benefit from skilled PT to improve independence with functional mobility, LE strength, balance, and safety.   Treatment Diagnosis: Impaired functional mobility  PT Education: Home Exercise Program;Transfer Training;Energy Conservation;Gait Training;Functional Mobility Training;General Safety  Patient Education: Pt will benefit from reinforcement  Barriers to Learning: cognition, [place]  REQUIRES PT FOLLOW UP: Yes  Activity Tolerance  Activity Tolerance: Patient Tolerated treatment well;Patient limited by endurance  Activity Tolerance: O2 sats >90% throughout mobility     Patient Diagnosis(es): The encounter diagnosis was Chest pain, unspecified type. has a past medical history of Allergic rhinitis, Back pain, CAD (coronary artery disease), Dementia ([address] and Hyperlipidemia. has a past surgical history that includes joint replacement (Bilateral, 1994); Cholecystectomy; Appendectomy; Tonsillectomy; and Coronary angioplasty with stent (05/2017). Restrictions  Position Activity Restriction  Other position/activity restrictions: up with assistance  Subjective   General  Chart Reviewed: Yes  Additional Pertinent Hx: CAD, HLD, Coronary stent, back pain, dementia  Response To Previous Treatment: Patient with no complaints from previous session. Family / Caregiver Present: No  Referring Practitioner: Geryl Handler  Subjective  Subjective: Pt seated up in chair upon arrival. Pt agreeable to PT tx.   Pain Screening  Patient Currently in Pain: Denies  Vital Signs  Patient Currently in Pain: Denies       Orientation  Orientation  Overall Orientation Status: Impaired  Orientation Level: Oriented to person;Oriented to place  Objective   Transfers  Sit to Stand: Contact guard assistance;Stand by assistance(from recliner chair x6)  Stand to sit: Stand by assistance;Contact guard assistance(to recliner chair x6, VC for hand placement)  Ambulation  Ambulation?: Yes  Ambulation 1  Device: Rolling Walker  Assistance: Contact guard assistance;Stand by assistance(CGA progressing to SBA)  Quality of Gait: flexed posture over walker, decreased samuel, decreased gait speed, decreased step length and height  Gait Deviations: Slow Samuel;Decreased step length;Decreased step height;Shuffles  Distance: 30'x2, 45'x1, 1x15'  Comments: increased time to perform amb, downward gaze , VC for upright posture and increasing step length  Stairs/Curb  Stairs?: No     Exercises  Hamstring Sets: hamstring curls in standing 10x kb  Hip Flexion: seated marches x20, standing marches at [facility] CGA  Hip Abduction: standing hip abduction x10 kb at RW with CGA  Knee Long Arc Quad: x10 B seated  Ankle Pumps: heel raises/toe raises x10 kb for each exercise  Other exercises  Other exercises?: Yes  Other exercises 1: hip adduction pillow squeezes x10     AM-PAC Score  AM-PAC Inpatient Mobility Raw Score : 17 (03/16/21 1554)  AM-PAC Inpatient T-Scale Score : 42.13 (03/16/21 1554)  Mobility Inpatient CMS 0-100% Score: 50.57 (03/16/21 1554)  Mobility Inpatient CMS G-Code Modifier : CK (03/16/21 1554)    Goals  Short term goals  Time Frame for Short term goals: Discharge  Short term goal 1: Bed mobility with SBA - ongoing  Short term goal 2: Transfer sit to stand with CG - met 3/16 progress to supervision  Short term goal 3: Ambulate 40 ft with RW and CG with good stability - met progress to 48' with supervision and LRAD  Patient Goals   Patient goals : not specifically stated    Plan    Plan  Times per week: 2-5  Times per day: Daily  Current Treatment Recommendations: Functional Mobility Training, Gait Training, Transfer Training, Safety Education & Training, Strengthening, Balance Training  Safety Devices  Type of devices:  All fall risk precautions in place, Call light within reach, Chair alarm in place, Left in chair, Telesitter in use  Restraints  Initially in place: No     Therapy Time   Individual Concurrent Group Co-treatment   Time In 1500         Time Out 1542         Minutes 42         Timed Code Treatment Minutes: [address] Jere Navas, PT, DPT

## 2021-03-16 NOTE — PROGRESS NOTES
Report called to RN at [facility]. Questions answered at this time.  Electronically signed by Heidi Vasquez RN on 3/16/2021 at 4:03 PM

## 2021-03-16 NOTE — CARE COORDINATION
Case Management Assessment            Discharge Note                    Date / Time of Note: 3/16/2021 11:16 AM                  Discharge Note Completed by: Nicky Beach    Patient Name: Lay Leigh   YOB: 1932  Diagnosis: Seizure [facility]) [R56.9]   Date / Time: 3/14/2021 11:17 AM    Current PCP: Shanthi Dickens MD  Clinic patient: No    Hospitalization in the last 30 days: No    Advance Directives:  Code Status: Limited  [place] DNR form completed and on chart: Yes    Financial:  Payor: Kusum Roach / Plan: MEDICARE PART A AND B / Product Type: *No Product type* /      Pharmacy:    [facility] [address], [facility]  [address]  Phone: [phone] Fax: [phone]      Assistance purchasing medications?: Potential Assistance Purchasing Medications: No  Assistance provided by Case Management: None at this time    Does patient want to participate in local refill/ meds to beds program?: No    Meds To Beds General Rules:  1. Can ONLY be done Monday- Friday between 8:30am-5pm  2. Prescription(s) must be in pharmacy by 3pm to be filled same day  3. Copy of patient's insurance/ prescription drug card and patient face sheet must be sent along with the prescription(s)  4. Cost of Rx cannot be added to hospital bill. If financial assistance is needed, please contact unit  or ;  or  CANNOT provide pharmacy voucher for patients co-pays  5.  Patients can then  the prescription on their way out of the hospital at discharge, or pharmacy can deliver to the bedside if staff is available. (payment due at time of pick-up or delivery - cash, check, or card accepted)     Able to afford home medications/ co-pay costs: Yes    ADLS:  Current PT AM-PAC Score: 17 /24  Current OT AM-PAC Score: 16 /24      DISCHARGE Disposition: [facility] (LTC): [facility] Phone: [phone]  Fax: [phone]    LOC at discharge: [address] Completed: Yes    Notification completed in HENS/PAS?:  Not Applicable    IMM Completed:   Not Indicated    Transportation:  Transportation PLAN for discharge: EMS transportation   Mode of Transport: Ambulance stretcher - BLS  Reason for medical transport: Other: siezure precautions, COVID POSITIVE, dementia  Name of [address]: [address]  Phone: [phone]  Time of Transport: 17:00    Transport form completed: Yes    Additional CM Notes: Pt to [facility] via [employer]. The Plan for Transition of Care is related to the following treatment goals of Seizure [facility]) [R56.9]    The Patient and/or patient representative Alyssa Crump and his family were provided with a choice of provider and agrees with the discharge plan Yes    Freedom of choice list was provided with basic dialogue that supports the patient's individualized plan of care/goals and shares the quality data associated with the providers.  Yes    Care Transitions patient: No    Malcolm Werner RN  [facility] "[employer]  Case Management Department  Ph: [phone]  Fax: [phone]

## 2021-03-16 NOTE — CONSULTS
Clinical Pharmacy Consult Note  Medication History     Admit Date: 3/14/2021    Pharmacy asked to verify home medications per Dr. Jacob Flowers, specifically Divalproex. Pharmacy updated patient's home med list on admission - please see note from Naz Becerra PharmD on 3/14. Please call with any questions.   Candice Sexton PharmD, [facility] # [phone]  3/16/2021 8:37 AM

## 2021-03-16 NOTE — PROGRESS NOTES
Interval History:  Clinically stable. No further seizures since admission.      Current Medications:    Current Facility-Administered Medications:     divalproex (DEPAKOTE SPRINKLE) capsule 250 mg, 250 mg, Oral, 3 times per day, ELAYNE Leal - CNP    0.9 % sodium chloride infusion, , Intravenous, Continuous, Barrett Greco MD, Last Rate: 75 mL/hr at 03/16/21 0218, New Bag at 03/16/21 0218    atorvastatin (LIPITOR) tablet 40 mg, 40 mg, Oral, Nightly, Barrett Greco MD, 40 mg at 03/15/21 2138    clopidogrel (PLAVIX) tablet 75 mg, 75 mg, Oral, Daily, Barrett Greco MD, 75 mg at 03/16/21 0800    metoprolol tartrate (LOPRESSOR) tablet 25 mg, 25 mg, Oral, Daily, Barrett Greco MD, Stopped at 03/15/21 0944    pantoprazole (PROTONIX) tablet 40 mg, 40 mg, Oral, QAM AC, Barrett Greco MD, 40 mg at 03/16/21 2540    traZODone (DESYREL) tablet 25 mg, 25 mg, Oral, Nightly, Barrett Greco MD, 25 mg at 03/15/21 2138    Vitamin D (CHOLECALCIFEROL) tablet 5,000 Units, 5,000 Units, Oral, Daily, Barrett Greco MD, 5,000 Units at 03/16/21 0800    sodium chloride flush 0.9 % injection 10 mL, 10 mL, Intravenous, 2 times per day, Barrett Greco MD, 10 mL at 03/15/21 2138    sodium chloride flush 0.9 % injection 10 mL, 10 mL, Intravenous, PRN, Barrett Greco MD    promethazine (PHENERGAN) tablet 12.5 mg, 12.5 mg, Oral, Q6H PRN **OR** ondansetron (ZOFRAN) injection 4 mg, 4 mg, Intravenous, Q6H PRN, Barrett Greco MD, 4 mg at 03/15/21 0940    polyethylene glycol (GLYCOLAX) packet 17 g, 17 g, Oral, Daily PRN, Barrett Greco MD    acetaminophen (TYLENOL) tablet 650 mg, 650 mg, Oral, Q6H PRN **OR** acetaminophen (TYLENOL) suppository 650 mg, 650 mg, Rectal, Q6H PRN, Barrett Greco MD    enoxaparin (LOVENOX) injection 40 mg, 40 mg, Subcutaneous, Daily, Barrett Greco MD, 40 mg at 03/16/21 0759    influenza quadrivalent split vaccine (FLUZONE;FLUARIX;FLULAVAL;AFLURIA) injection 0.5 mL, 0.5 mL, Intramuscular, Prior to discharge, Thi Zaragoza MD      Physical Exam  Constitutional  /69   Pulse 56   Temp 97.5 °F (36.4 °C) (Oral)   Resp 16   Ht 6' (1.829 m)   Wt 178 lb (80.7 kg)   SpO2 96%   BMI 24.14 kg/m²     Patient was seen during Covid-19 pandemic and all efforts made to respect recommendations of social distancing and decrease PPE have been made. Unable to perform face to face examination of the patient. Exam taken from nurse. Oriented to self. Moving all extremities. Images:   MRI brain:   1. No acute intracranial abnormality. 2.  Moderate cerebral atrophy and moderate chronic small vessel ischemic disease. CT head wo contrast:   No acute intracranial abnormality. Right maxillary sinus disease with air-fluid level. CTPA:  1. No evidence of pulmonary embolus. 2. Patchy groundglass opacities in right middle lobe and bilateral lower lobes, likely relate to multifocal pneumonia. Recommended follow-up as indicated clinically. Pertinent Labs:      Ref. Range 3/16/2021 05:05   Albumin Latest Ref Range: 3.4 - 5.0 g/dL 2.9 (L)   Alk Phos Latest Ref Range: 40 - 129 U/L 306 (H)   ALT Latest Ref Range: 10 - 40 U/L 9 (L)   AST Latest Ref Range: 15 - 37 U/L 16   Bilirubin Latest Ref Range: 0.0 - 1.0 mg/dL <0.2   Bilirubin, Direct Latest Ref Range: 0.0 - 0.3 mg/dL <0.2   Bilirubin, Indirect Latest Ref Range: 0.0 - 1.0 mg/dL see below   Total Protein Latest Ref Range: 6.4 - 8.2 g/dL 5.7 (L)       Assessment:80year old man with dementia who presented with spell and chest pain. He is certainly more at risk for seizure due to his history of dementia and reported history of previous seizure. Covid pneumonia could have been trigger for breakthrough seizure     Spell Semiology: blank stare, was both arms and head.  Lasted one minute       Plan:  -Increased depakote to 250mg TID  -Routine EEG as outpatient (covid +) and unlikely to   -No further

## 2021-03-16 NOTE — DISCHARGE SUMMARY
Hospital Medicine Discharge Summary    Patient ID: Lay Leigh   Gender: male  : 10/27/1932   Age: 80 y.o. MRN: [id number]  Code Status: Limited    Patient's PCP: Shanthi Dickens MD    Admit Date: 3/14/2021     Discharge Date:   21    Admitting Physician: Cedrick Willard MD     Discharge Physician: Mya Dahl MD     Discharge Diagnoses:  - Seizure  - Chest pain  - CAD  - Covid  - Mood disorder  - Hx of dementia  - HTN       Active Hospital Problems    Diagnosis Date Noted    Seizure [facility]) [R56.9] 2021       The patient was seen and examined on day of discharge and this discharge summary is in conjunction with any daily progress note from day of discharge. Hospital Course:   80 y.o. male with PMH of CAD, dementia, mood disorder (on depakote), hyperlipidemia, allergic rhinitis, back  pain who presented to [facility], [facility]. with Seizures and Chest Pain  Pt has dementia and is a poor historian due to pt's memory deficits. History was mostly obtained from pt's son Shreya Tran and from EMR. Per pt's son Shreya Tran pt has short term memory deficits and forgets events within 10 to 20 minutes. At the time of the encounter pt states he does not remember having had a seizure earlier and or having had chest pain. Pt's son states that he was not given much detail about the pt's seizure episode. Per ED notes pt comes from the [facility], where he had a one minute episode of sitting in a chair when he suddenly had a blank stare, was shaking both arms and head, dropped an item, but did not sustain a fall or hit his head. He was confused after the episode. Per son Shreya Tran pt was more confused than usual in the ED and initially did not recognize the son, but he states that he has not been able to see the pt over this past year and as they started talking the pt did recognize him and asked about the other son as well.  The son states that the pt does not have a history of seizures but had Covid a couple of months ago and that he was treated for pneumonia 2 weeks ago as well. Pt's son states that in case of a cardiac arrest he would not like the pt to be resuscitated and would not like the pt to be intubated in case of respiratory worsening.     ED course: Initial troponin elevated to 0.03.  BNP elevated.  Chest x-ray unremarkable.  CT head unremarkable.  D dimer positive, age adjusted.  CTPA shows no acute PE, but concern for groundglass opacities. Per EMS, patient's EKG read as a fib. Per ED on review of EKG performed by EMS, patient was in sinus bradycardia with significant artifact. P waves seen in lead III. The Neurology team was consulted to evaluate the pt for the seizure. An MRI brain was performed and showed no acute intracranial abnormality, but showed moderate cerebral atrophy and moderate chronic small vessel ischemic disease. Per the Neurology recommendations the depakote dose was increased to 250mg PO TID. The risperidal and lexapro have been discontinued as they are known to lower the seizure threshhold. Pt should obtain a spot EEG as outpatient as an he was not able to obtain one as inpatient due to pt being Covid positive. Pt's chest pain was evaluated with a CT PE in the ED and did not show any evidence of PE but did show patchy groundglass opacities in right middle lobe and bilateral lower lobes, likely relate to multifocal pneumonia. These changes were likely related to pt's recent Covid illness in January, since patient is currently asymptomatic. Subsequent Covid testing has returned positive. Pt's troponin was 0.03 x2 and downtrended to 0.02 without ST changes on EKG. On day of discharge pt is stable and resting comfortably in bed without any complaints. Oriented to self and hospital, but not to hospital name, age, or year. Oxygenating well on room air. Denies nausea, vomiting, chest pain, shortness of breath, fevers, chills.       Disposition:  [facility] facility    Physical Exam Performed:     BP (!) 141/73   Pulse 61   Temp 98.3 °F (36.8 °C) (Oral)   Resp 17   Ht 6' (1.829 m)   Wt 178 lb (80.7 kg)   SpO2 96%   BMI 24.14 kg/m²       General appearance:  No apparent distress, appears stated age and cooperative. HEENT:  Normal cephalic, atraumatic without obvious deformity. Pupils equal, round, and reactive to light. Extra ocular muscles intact. Conjunctivae/corneas clear. Neck: Supple, with full range of motion. No jugular venous distention. Trachea midline. Respiratory:  Normal respiratory effort. Clear to auscultation, bilaterally without Rales/Wheezes/Rhonchi. Cardiovascular:  Regular rate and rhythm with normal S1/S2 without murmurs, rubs or gallops. Abdomen: Soft, non-tender, non-distended with normal bowel sounds. Musculoskeletal:  No clubbing, cyanosis or edema bilaterally. Full range of motion without deformity. Skin: Skin color, texture, turgor normal.  No rashes or lesions. Neurologic:  Neurovascularly intact without any focal sensory/motor deficits. Cranial nerves: II-XII intact, grossly non-focal.  Psychiatric: Oriented to self and hospital, but not to hospital name, age, or year. Hx of dementia. Capillary Refill: Brisk,< 3 seconds   Peripheral Pulses: +2 palpable, equal bilaterally       Labs: For convenience and continuity at follow-up the following most recent labs are provided:      CBC:    Lab Results   Component Value Date    WBC 6.9 03/16/2021    HGB 12.1 03/16/2021    HCT 36.8 03/16/2021     03/16/2021       Renal:    Lab Results   Component Value Date     03/16/2021    K 4.0 03/16/2021     03/16/2021    CO2 29 03/16/2021    BUN 29 03/16/2021    CREATININE 1.2 03/16/2021    CALCIUM 8.8 03/16/2021    PHOS 3.3 05/16/2017         Significant Diagnostic Studies    Radiology:   MRI BRAIN W WO CONTRAST   Final Result      1. No acute intracranial abnormality.    2.  Moderate cerebral atrophy and moderate chronic small vessel ischemic disease. XR ABDOMEN (KUB) (SINGLE AP VIEW)   Final Result   1. There is limited exam since the patient received IV contrast for CT pulmonary angiogram 19 hours ago and has renal contrast material still appreciated within the collecting systems. 2. Scattered gas in the large and small bowel without signs of any obstruction or free air      CT CHEST PULMONARY EMBOLISM W CONTRAST   Final Result      1. No evidence of pulmonary embolus. 2. Patchy groundglass opacities in right middle lobe and bilateral lower lobes, likely relate to multifocal pneumonia. Recommended follow-up as indicated clinically. CT Head WO Contrast   Final Result      No acute intracranial abnormality. Right maxillary sinus disease with air-fluid level. XR CHEST PORTABLE   Final Result      Bibasilar mild streaky opacities likely relate to atelectasis. Consults:     IP CONSULT TO HOSPITALIST  IP CONSULT TO NEUROLOGY  IP CONSULT TO PHARMACY    [facility]    Condition at Discharge: Stable    Discharge Instructions/Follow-up:  Please make an appointment with your PCP for the hospital visit follow up as soon as possible. Please take the new dose of depakote as prescribed: 250mg PO TID. Please discontinue taking risperidal and lexapro as they are known to lower the seizure threshhold. Please obtain a spot EEG as outpatient to evaluate for seizures.       Code Status:  Limited     Activity: activity as tolerated    Diet: Cardiac      Discharge Medications:     Current Discharge Medication List           Details   divalproex (DEPAKOTE SPRINKLE) 125 MG capsule Take 2 capsules by mouth every 8 hours  Qty: 60 capsule, Refills: 3              Details   acetaminophen (TYLENOL) 325 MG tablet Take 650 mg by mouth every 6 hours as needed for Pain      clopidogrel (PLAVIX) 75 MG tablet Take 75 mg by mouth daily      aluminum & magnesium hydroxide-simethicone (MAALOX) 545-149-36 MG/5ML SUSP suspension Take 30 mLs by mouth every 4 hours as needed for Indigestion      metoprolol tartrate (LOPRESSOR) 25 MG tablet Take 25 mg by mouth daily      magnesium hydroxide (MILK OF MAGNESIA) 400 MG/5ML suspension Take 30 mLs by mouth daily as needed for Constipation      polyethylene glycol (GLYCOLAX) 17 g packet Take 17 g by mouth daily      traZODone (DESYREL) 50 MG tablet Take 25 mg by mouth nightly      vitamin D (CHOLECALCIFEROL) 25 MCG (1000 UT) TABS tablet Take 5,000 Units by mouth daily      donepezil (ARICEPT) 5 MG tablet TAKE ONE TABLET BY MOUTH ONCE NIGHTLY  Qty: 90 tablet, Refills: 0      atorvastatin (LIPITOR) 40 MG tablet TAKE ONE TABLET BY MOUTH ONCE NIGHTLY  Qty: 90 tablet, Refills: 0      pantoprazole (PROTONIX) 40 MG tablet 1 tab po daily  Qty: 90 tablet, Refills: 2             Time Spent on discharge is more than 45 min in the examination, evaluation, counseling and review of medications and discharge plan. Signed:    Lien Chau MD  Internal Medicine Resident, PGY-1  03/16/21      Thank you Darrin Taylor MD for the opportunity to be involved in this patient's care.

## 2021-03-16 NOTE — DISCHARGE INSTR - COC
Isolation/Infection:   Isolation            Droplet Plus          Patient Infection Status       Infection Onset Added Last Indicated Last Indicated By Review Planned Expiration Resolved Resolved By    COVID-19 03/14/21 03/15/21 03/14/21 COVID-19 03/22/21 03/28/21      Resolved    COVID-19 Rule Out 03/14/21 03/14/21 03/14/21 COVID-19 (Ordered)   03/15/21 Rule-Out Test Resulted            Nurse Assessment:  Last Vital Signs: BP (!) 141/73   Pulse 61   Temp 98.3 °F (36.8 °C) (Oral)   Resp 17   Ht 6' (1.829 m)   Wt 178 lb (80.7 kg)   SpO2 96%   BMI 24.14 kg/m²     Last documented pain score (0-10 scale): Pain Level: 0  Last Weight:   Wt Readings from Last 1 Encounters:   03/14/21 178 lb (80.7 kg)     Mental Status:  disoriented, alert and able to concentrate and follow conversation    IV Access:  - None    Nursing Mobility/ADLs:  Walking   Assisted  Transfer  Assisted  Bathing  Assisted  Dressing  Assisted  Toileting  Assisted  Feeding  Independent  Med Admin  Independent  Med Delivery   whole    Wound Care Documentation and Therapy:        Elimination:  Continence:   · Bowel: No  · Bladder: No  Urinary Catheter: None   Colostomy/Ileostomy/Ileal Conduit: No       Date of Last BM: Unknown    Intake/Output Summary (Last 24 hours) at 3/16/2021 1133  Last data filed at 3/16/2021 0805  Gross per 24 hour   Intake 2783.75 ml   Output 300 ml   Net 2483.75 ml     I/O last 3 completed shifts: In: 1733.8 [P.O.:420; I.V.:1313.8]  Out: 300 [Urine:300]    Safety Concerns: At Risk for Falls and History of Seizures    Impairments/Disabilities:      Vision    Nutrition Therapy:  Current Nutrition Therapy:   - Oral Diet:  Cardiac    Routes of Feeding: Oral  Liquids:  Thin Liquids  Daily Fluid Restriction: no  Last Modified Barium Swallow with Video (Video Swallowing Test): not done    Treatments at the Time of Hospital Discharge:   Respiratory Treatments: None  Oxygen Therapy:  is not on home oxygen therapy. Ventilator:    - No ventilator support    Rehab Therapies: Physical Therapy and Occupational Therapy  Weight Bearing Status/Restrictions: No weight bearing restirctions  Other Medical Equipment (for information only, NOT a DME order):  walker  Other Treatments: None    Patient's personal belongings (please select all that are sent with patient):  None    RN SIGNATURE:  Electronically signed by Collin Davidson RN on 3/16/21 at 1:29 PM EDT    CASE MANAGEMENT/SOCIAL WORK SECTION    Inpatient Status Date: 3/14/2021    Readmission Risk Assessment Score:  Readmission Risk              Risk of Unplanned Readmission:        16           Discharging to Facility/ [address] [place] [address] [place]               [address], [address] [facility] [address]       Phone: [phone]       Fax: [phone]            / signature: Electronically signed by Atilio Tabares RN on 3/16/21 at 11:59 AM EDT    PHYSICIAN SECTION    Prognosis: Good    Condition at Discharge: Stable    Rehab Potential (if transferring to Rehab): Good    Recommended Labs or Other Treatments After Discharge: PT/OT    Physician Certification: I certify the above information and transfer of Janine Garcia  is necessary for the continuing treatment of the diagnosis listed and that he requires long-term care for greater than 30 days.      Update Admission H&P: No change in H&P    PHYSICIAN SIGNATURE:  Electronically signed by Deidra Cheadle, MD/Kolton Anglin MD on 3/16/21 at 11:33 AM EDT

## 2021-03-16 NOTE — PROGRESS NOTES
Internal Medicine PGY-1 Resident Progress Note        PCP: Ila Dubois MD    Date of Admission: 3/14/2021    Chief Complaint: Seizures and Chest Pain        Interval History:   No acute events overnight. Pt resting comfortably in bed. Oriented to self and hospital, but not to hospital name, age, or year. EEG was not performed yesterday due to pt being Covid positive. Neurology team recommending outpatient EEG. MRI brain yesterday unremarkable. Pt is stable and medically ready for discharge. Per CM pt is to return to [facility] on discharge and precert is not required. Denies chest pain, abdominal pain, shortness of breath. Hospital Course:   80 y.o. male with PMH of CAD, dementia, mood disorder (on depakote), hyperlipidemia, allergic rhinitis, back  pain who presented to [facility], [facility]. with Seizures and Chest Pain  Pt has dementia and is a poor historian due to pt's memory deficits. History was mostly obtained from pt's son Travis Wu and from EMR. Per pt's son Travis Wu pt has short term memory deficits and forgets events within 10 to 20 minutes. At the time of the encounter pt states he does not remember having had a seizure earlier and or having had chest pain. Pt's son states that he was not given much detail about the pt's seizure episode. Per ED notes pt comes from the [facility] facility, where he had a one minute episode of sitting in a chair when he suddenly had a blank stare, was shaking both arms and head, dropped an item, but did not sustain a fall or hit his head. He was confused after the episode. Per son Travis Wu pt was more confused than usual in the ED and initially did not recognize the son, but he states that he has not been able to see the pt over this past year and as they started talking the pt did recognize him and asked about the other son as well.  The son states that the pt does not have a history of seizures but had Covid a couple of months ago and that he was treated for pneumonia 2 weeks ago as well. Pt's son states that in case of a cardiac arrest he would not like the pt to be resuscitated and would not like the pt to be intubated in case of respiratory worsening. Pt complained of chest pain in the ED but denied chest pain during this encounter.     ED course: Initial troponin elevated to 0.03.  BNP elevated.  Chest x-ray unremarkable.  CT head unremarkable.  D dimer positive, age adjusted.  CTPA shows no acute PE, but concern for groundglass opacities. Per EMS, patient's EKG read as a fib. Per ED on review of EKG performed by EMS, patient was in sinus bradycardia with significant artifact. P waves seen in lead III. Medications:  Reviewed    Infusion Medications    sodium chloride 75 mL/hr at 03/16/21 0218     Scheduled Medications    divalproex  250 mg Oral 3 times per day    atorvastatin  40 mg Oral Nightly    clopidogrel  75 mg Oral Daily    metoprolol tartrate  25 mg Oral Daily    pantoprazole  40 mg Oral QAM AC    traZODone  25 mg Oral Nightly    Vitamin D  5,000 Units Oral Daily    sodium chloride flush  10 mL Intravenous 2 times per day    enoxaparin  40 mg Subcutaneous Daily    influenza virus vaccine  0.5 mL Intramuscular Prior to discharge     PRN Meds: sodium chloride flush, promethazine **OR** ondansetron, polyethylene glycol, acetaminophen **OR** acetaminophen      Intake/Output Summary (Last 24 hours) at 3/16/2021 1009  Last data filed at 3/16/2021 0805  Gross per 24 hour   Intake 2783.75 ml   Output 300 ml   Net 2483.75 ml       Physical Exam Performed:    /69   Pulse 56   Temp 97.5 °F (36.4 °C) (Oral)   Resp 16   Ht 6' (1.829 m)   Wt 178 lb (80.7 kg)   SpO2 96%   BMI 24.14 kg/m²     General appearance: No apparent distress, appears stated age and cooperative. HEENT: Pupils equal, round, and reactive to light. Conjunctivae/corneas clear. Neck: Supple, with full range of motion. No jugular venous distention. Trachea midline.   Respiratory:  Normal respiratory effort. Clear to auscultation, bilaterally without Rales/Wheezes/Rhonchi. Cardiovascular: Regular rate and rhythm with normal S1/S2 without murmurs, rubs or gallops. Abdomen: Tender to palpation in all four quadrants. Soft, non-distended with normal bowel sounds. Musculoskeletal: Trace edema in lower extremities. No clubbing, cyanosis bilaterally. Full range of motion without deformity. Skin: Skin color, texture, turgor normal.  No rashes or lesions. Neurologic:  Neurovascularly intact without any focal sensory/motor deficits. Cranial nerves: II-XII intact, grossly non-focal.  Psychiatric: Oriented to person only. Alert, thought content appropriate, normal insight  Capillary Refill: Brisk,< 3 seconds   Peripheral Pulses: +2 palpable, equal bilaterally       Labs:   Recent Labs     03/14/21  1143 03/15/21  0626 03/16/21  0505   WBC 7.8 7.4 6.9   HGB 12.1* 12.9* 12.1*   HCT 37.1* 39.3* 36.8*    148 132*     Recent Labs     03/14/21  1143 03/15/21  0626 03/16/21  0505    142 141   K 4.4 3.5 4.0    103 105   CO2 29 32 29   BUN 25* 21* 29*   CREATININE 1.1 0.9 1.2   CALCIUM 9.5 9.7 8.8     Recent Labs     03/15/21  0626 03/16/21  0505   AST 18 16   ALT 10 9*   BILIDIR <0.2 <0.2   BILITOT 0.4 <0.2   ALKPHOS 347* 306*     No results for input(s): INR in the last 72 hours. Recent Labs     03/14/21  1143 03/14/21  1339 03/14/21  2009 03/15/21  0626   CKTOTAL  --   --   --  57   TROPONINI 0.03* 0.03* 0.02*  --        Urinalysis:      Lab Results   Component Value Date    NITRU Negative 03/14/2021    WBCUA 0-2 03/14/2021    RBCUA 3-4 03/14/2021    BLOODU Negative 03/14/2021    SPECGRAV 1.020 03/14/2021    GLUCOSEU Negative 03/14/2021       Radiology:  MRI BRAIN W WO CONTRAST   Final Result      1. No acute intracranial abnormality. 2.  Moderate cerebral atrophy and moderate chronic small vessel ischemic disease. XR ABDOMEN (KUB) (SINGLE AP VIEW)   Final Result   1.  There is limited exam since the patient received IV contrast for CT pulmonary angiogram 19 hours ago and has renal contrast material still appreciated within the collecting systems. 2. Scattered gas in the large and small bowel without signs of any obstruction or free air      CT CHEST PULMONARY EMBOLISM W CONTRAST   Final Result      1. No evidence of pulmonary embolus. 2. Patchy groundglass opacities in right middle lobe and bilateral lower lobes, likely relate to multifocal pneumonia. Recommended follow-up as indicated clinically. CT Head WO Contrast   Final Result      No acute intracranial abnormality. Right maxillary sinus disease with air-fluid level. XR CHEST PORTABLE   Final Result      Bibasilar mild streaky opacities likely relate to atelectasis. Assessment/Plan:    Lavonne Dwyer, 80 y.o. male who presented with Seizures and Chest Pain    Active Hospital Problems    Diagnosis Date Noted    Seizure [facility]) [R56.9] 03/14/2021     Seizure - pt comes from the [facility] facility, where he had a one minute episode of sitting in a chair when he suddenly had a blank stare, was shaking both arms and head, dropped an item, but did not sustain a fall or hit his head. He was confused after the episode but seemed to be at baseline in the ED. Pt's son states that pt has no hx of seizures. Pt's son states that pt had Covid a few months ago.   - seizure precautions  - BMP, CBC unremarkable  - UA negative for infection  - CT head negative for hemorrhage  - Neurology team consulted, appreciate recs  - CT chest showing patchy groundglass opacities in right middle lobe and bilateral lower lobes, likely relate to multifocal pneumonia - likely residual changes from recent Covid infection  - repeat interval CXR in 6 wks for resolution of opacities  - procalcitonin 0.07  - f/u spot EEG  - hx MRI abdomen 10/2018 showing mass lesion at the inferior pole right kidney suspicious of renal cell carcinoma or oncocytoma; US renal 4/2019 showing solid nodule in right kidney   - f/u brain MRI w/ and w/o contrast to evaluate for seizure focus  - started fluids - NS IV at 75ml/hr     Chest pain - Per EMS, patient's EKG read as a fib. On review of EKG performed by EMS, patient is in sinus bradycardia with significant artifact. P waves seen in lead III. Pt has trace edema in lower extremities.   - EKG sinus bradycardia, premature atrial complexes  - troponin 0.03 x2 --> 0.02  - proBNP 579  - d-dimer 850  - f/u limited ECHO for trace edema in lower extremities  - lactic acid 1.1  - telemetry monitoring  - CK 57, ALK phos 347, ALT/AST wnl     CAD  - plavix, statin  - f/u ECHO     Covid positive -  had Covid PNA around January and has received one dose of the Covid vaccine  - Covid testing positive  - droplet plus isolation     Mood disorder  - depakote  - trazodone     Hx dementia  - continue risperidone     HTN  - continue lopressor             DVT Prophylaxis: Lovenox  Diet: DIET CARDIAC;  Code Status: Limited  PT/OT Eval Status: ordered  Dispo - [facility]      I will discuss the patient with the senior resident and MD Pollo Butler MD   Internal Medicine Resident PGY-1  03/16/21

## 2021-03-16 NOTE — PROGRESS NOTES
Pt discharged to [facility]. IV and tele removed.  Electronically signed by Jason Singh RN on 3/16/2021 at 5:41 PM

## 2021-03-16 NOTE — DISCHARGE INSTR - DIET

## 2021-03-16 NOTE — PLAN OF CARE
Problem: Falls - Risk of:  Goal: Will remain free from falls  Description: Will remain free from falls  3/15/2021 2139 by Nikolai Burnett RN  Outcome: Ongoing  3/15/2021 1028 by Kevin Butler RN  Note: Pt free from injury or falls at this time, fall precautions in place, bed in low position, side rail up x2, [place] Fall Risk: High (45 and higher), bed alarm on, reoriented to room and call light, reminded not to get up without assistance, call light in reach, will continue to monitor.      Goal: Absence of physical injury  Description: Absence of physical injury  Outcome: Ongoing

## 2021-03-16 NOTE — PROGRESS NOTES
Pt's son Tej April updated on patient status, plan of care, and plan to discharge pt back to [facility] at 5:00 this evening. Questions answered at this time.  Electronically signed by Ashley Reveles RN on 3/16/2021 at 12:42 PM

## 2021-09-04 ENCOUNTER — APPOINTMENT (OUTPATIENT)
Dept: GENERAL RADIOLOGY | Age: 86
DRG: 536 | End: 2021-09-04
Payer: MEDICARE

## 2021-09-04 ENCOUNTER — APPOINTMENT (OUTPATIENT)
Dept: CT IMAGING | Age: 86
DRG: 536 | End: 2021-09-04
Payer: MEDICARE

## 2021-09-04 ENCOUNTER — HOSPITAL ENCOUNTER (INPATIENT)
Age: 86
LOS: 1 days | Discharge: NURSING, REHAB OR LONG-TERM CARE | DRG: 536 | End: 2021-09-05
Attending: STUDENT IN AN ORGANIZED HEALTH CARE EDUCATION/TRAINING PROGRAM | Admitting: INTERNAL MEDICINE
Payer: MEDICARE

## 2021-09-04 DIAGNOSIS — S72.001A HIP FRACTURE, RIGHT, CLOSED, INITIAL ENCOUNTER (HCC): ICD-10-CM

## 2021-09-04 DIAGNOSIS — S72.001A CLOSED FRACTURE OF RIGHT HIP, INITIAL ENCOUNTER (HCC): Primary | ICD-10-CM

## 2021-09-04 LAB
ANION GAP SERPL CALCULATED.3IONS-SCNC: 10 MMOL/L (ref 3–16)
BASOPHILS ABSOLUTE: 0.1 K/UL (ref 0–0.2)
BASOPHILS RELATIVE PERCENT: 0.6 %
BILIRUBIN URINE: NEGATIVE
BLOOD, URINE: NEGATIVE
BUN BLDV-MCNC: 32 MG/DL (ref 7–20)
CALCIUM SERPL-MCNC: 9.4 MG/DL (ref 8.3–10.6)
CHLORIDE BLD-SCNC: 101 MMOL/L (ref 99–110)
CLARITY: CLEAR
CO2: 25 MMOL/L (ref 21–32)
COLOR: YELLOW
CREAT SERPL-MCNC: 0.9 MG/DL (ref 0.8–1.3)
EOSINOPHILS ABSOLUTE: 0.2 K/UL (ref 0–0.6)
EOSINOPHILS RELATIVE PERCENT: 2.5 %
GFR AFRICAN AMERICAN: >60
GFR NON-AFRICAN AMERICAN: >60
GLUCOSE BLD-MCNC: 112 MG/DL (ref 70–99)
GLUCOSE URINE: NEGATIVE MG/DL
HCT VFR BLD CALC: 37.9 % (ref 40.5–52.5)
HEMOGLOBIN: 12.5 G/DL (ref 13.5–17.5)
KETONES, URINE: NEGATIVE MG/DL
LEUKOCYTE ESTERASE, URINE: NEGATIVE
LYMPHOCYTES ABSOLUTE: 1.5 K/UL (ref 1–5.1)
LYMPHOCYTES RELATIVE PERCENT: 16.8 %
MCH RBC QN AUTO: 33.4 PG (ref 26–34)
MCHC RBC AUTO-ENTMCNC: 33.1 G/DL (ref 31–36)
MCV RBC AUTO: 100.9 FL (ref 80–100)
MICROSCOPIC EXAMINATION: NORMAL
MONOCYTES ABSOLUTE: 0.9 K/UL (ref 0–1.3)
MONOCYTES RELATIVE PERCENT: 9.5 %
NEUTROPHILS ABSOLUTE: 6.5 K/UL (ref 1.7–7.7)
NEUTROPHILS RELATIVE PERCENT: 70.6 %
NITRITE, URINE: NEGATIVE
PDW BLD-RTO: 13.7 % (ref 12.4–15.4)
PH UA: 6 (ref 5–8)
PLATELET # BLD: 144 K/UL (ref 135–450)
PMV BLD AUTO: 7.4 FL (ref 5–10.5)
POTASSIUM REFLEX MAGNESIUM: 4.5 MMOL/L (ref 3.5–5.1)
PROCALCITONIN: 0.07 NG/ML (ref 0–0.15)
PROTEIN UA: NEGATIVE MG/DL
RBC # BLD: 3.75 M/UL (ref 4.2–5.9)
SODIUM BLD-SCNC: 136 MMOL/L (ref 136–145)
SPECIFIC GRAVITY UA: 1.02 (ref 1–1.03)
URINE REFLEX TO CULTURE: NORMAL
URINE TYPE: NORMAL
UROBILINOGEN, URINE: 0.2 E.U./DL
WBC # BLD: 9.2 K/UL (ref 4–11)

## 2021-09-04 PROCEDURE — 1200000000 HC SEMI PRIVATE

## 2021-09-04 PROCEDURE — 6370000000 HC RX 637 (ALT 250 FOR IP): Performed by: PHYSICIAN ASSISTANT

## 2021-09-04 PROCEDURE — G0378 HOSPITAL OBSERVATION PER HR: HCPCS

## 2021-09-04 PROCEDURE — 81003 URINALYSIS AUTO W/O SCOPE: CPT

## 2021-09-04 PROCEDURE — 70450 CT HEAD/BRAIN W/O DYE: CPT

## 2021-09-04 PROCEDURE — 93005 ELECTROCARDIOGRAM TRACING: CPT | Performed by: INTERNAL MEDICINE

## 2021-09-04 PROCEDURE — 73521 X-RAY EXAM HIPS BI 2 VIEWS: CPT

## 2021-09-04 PROCEDURE — 2580000003 HC RX 258: Performed by: INTERNAL MEDICINE

## 2021-09-04 PROCEDURE — 80048 BASIC METABOLIC PNL TOTAL CA: CPT

## 2021-09-04 PROCEDURE — 6360000002 HC RX W HCPCS: Performed by: INTERNAL MEDICINE

## 2021-09-04 PROCEDURE — 72125 CT NECK SPINE W/O DYE: CPT

## 2021-09-04 PROCEDURE — 96372 THER/PROPH/DIAG INJ SC/IM: CPT

## 2021-09-04 PROCEDURE — 99285 EMERGENCY DEPT VISIT HI MDM: CPT

## 2021-09-04 PROCEDURE — 85025 COMPLETE CBC W/AUTO DIFF WBC: CPT

## 2021-09-04 PROCEDURE — 6370000000 HC RX 637 (ALT 250 FOR IP): Performed by: INTERNAL MEDICINE

## 2021-09-04 PROCEDURE — 73700 CT LOWER EXTREMITY W/O DYE: CPT

## 2021-09-04 PROCEDURE — 71046 X-RAY EXAM CHEST 2 VIEWS: CPT

## 2021-09-04 PROCEDURE — 84145 PROCALCITONIN (PCT): CPT

## 2021-09-04 PROCEDURE — 36415 COLL VENOUS BLD VENIPUNCTURE: CPT

## 2021-09-04 RX ORDER — OXYCODONE HYDROCHLORIDE 5 MG/1
5 TABLET ORAL EVERY 4 HOURS PRN
Status: DISCONTINUED | OUTPATIENT
Start: 2021-09-04 | End: 2021-09-05 | Stop reason: HOSPADM

## 2021-09-04 RX ORDER — PANTOPRAZOLE SODIUM 40 MG/1
40 TABLET, DELAYED RELEASE ORAL
Status: DISCONTINUED | OUTPATIENT
Start: 2021-09-05 | End: 2021-09-05 | Stop reason: HOSPADM

## 2021-09-04 RX ORDER — DONEPEZIL HYDROCHLORIDE 10 MG/1
10 TABLET, FILM COATED ORAL NIGHTLY
COMMUNITY

## 2021-09-04 RX ORDER — SODIUM CHLORIDE 9 MG/ML
25 INJECTION, SOLUTION INTRAVENOUS PRN
Status: DISCONTINUED | OUTPATIENT
Start: 2021-09-04 | End: 2021-09-05 | Stop reason: HOSPADM

## 2021-09-04 RX ORDER — SODIUM CHLORIDE 9 MG/ML
INJECTION, SOLUTION INTRAVENOUS CONTINUOUS
Status: DISCONTINUED | OUTPATIENT
Start: 2021-09-04 | End: 2021-09-05

## 2021-09-04 RX ORDER — ONDANSETRON 4 MG/1
4 TABLET, ORALLY DISINTEGRATING ORAL EVERY 8 HOURS PRN
Status: DISCONTINUED | OUTPATIENT
Start: 2021-09-04 | End: 2021-09-05 | Stop reason: HOSPADM

## 2021-09-04 RX ORDER — DONEPEZIL HYDROCHLORIDE 10 MG/1
10 TABLET, FILM COATED ORAL NIGHTLY
Status: DISCONTINUED | OUTPATIENT
Start: 2021-09-04 | End: 2021-09-05 | Stop reason: HOSPADM

## 2021-09-04 RX ORDER — SODIUM CHLORIDE 0.9 % (FLUSH) 0.9 %
5-40 SYRINGE (ML) INJECTION PRN
Status: DISCONTINUED | OUTPATIENT
Start: 2021-09-04 | End: 2021-09-05 | Stop reason: HOSPADM

## 2021-09-04 RX ORDER — ATORVASTATIN CALCIUM 40 MG/1
40 TABLET, FILM COATED ORAL NIGHTLY
Status: DISCONTINUED | OUTPATIENT
Start: 2021-09-04 | End: 2021-09-05 | Stop reason: HOSPADM

## 2021-09-04 RX ORDER — OXYCODONE HYDROCHLORIDE AND ACETAMINOPHEN 5; 325 MG/1; MG/1
1 TABLET ORAL ONCE
Status: COMPLETED | OUTPATIENT
Start: 2021-09-04 | End: 2021-09-04

## 2021-09-04 RX ORDER — ACETAMINOPHEN 500 MG
1000 TABLET ORAL EVERY 8 HOURS
Status: DISCONTINUED | OUTPATIENT
Start: 2021-09-04 | End: 2021-09-05 | Stop reason: HOSPADM

## 2021-09-04 RX ORDER — POLYETHYLENE GLYCOL 3350 17 G/17G
17 POWDER, FOR SOLUTION ORAL DAILY PRN
Status: DISCONTINUED | OUTPATIENT
Start: 2021-09-04 | End: 2021-09-05 | Stop reason: HOSPADM

## 2021-09-04 RX ORDER — SODIUM CHLORIDE 0.9 % (FLUSH) 0.9 %
5-40 SYRINGE (ML) INJECTION EVERY 12 HOURS SCHEDULED
Status: DISCONTINUED | OUTPATIENT
Start: 2021-09-04 | End: 2021-09-05 | Stop reason: HOSPADM

## 2021-09-04 RX ORDER — ACETAMINOPHEN 325 MG/1
650 TABLET ORAL EVERY 6 HOURS PRN
Status: DISCONTINUED | OUTPATIENT
Start: 2021-09-04 | End: 2021-09-05 | Stop reason: HOSPADM

## 2021-09-04 RX ORDER — ONDANSETRON 2 MG/ML
4 INJECTION INTRAMUSCULAR; INTRAVENOUS EVERY 6 HOURS PRN
Status: DISCONTINUED | OUTPATIENT
Start: 2021-09-04 | End: 2021-09-05 | Stop reason: HOSPADM

## 2021-09-04 RX ORDER — OXYCODONE HYDROCHLORIDE AND ACETAMINOPHEN 5; 325 MG/1; MG/1
2 TABLET ORAL EVERY 4 HOURS PRN
Status: DISCONTINUED | OUTPATIENT
Start: 2021-09-04 | End: 2021-09-04

## 2021-09-04 RX ORDER — ACETAMINOPHEN 650 MG/1
650 SUPPOSITORY RECTAL EVERY 6 HOURS PRN
Status: DISCONTINUED | OUTPATIENT
Start: 2021-09-04 | End: 2021-09-05 | Stop reason: HOSPADM

## 2021-09-04 RX ORDER — MAGNESIUM HYDROXIDE/ALUMINUM HYDROXICE/SIMETHICONE 120; 1200; 1200 MG/30ML; MG/30ML; MG/30ML
30 SUSPENSION ORAL EVERY 4 HOURS PRN
Status: DISCONTINUED | OUTPATIENT
Start: 2021-09-04 | End: 2021-09-05 | Stop reason: HOSPADM

## 2021-09-04 RX ORDER — OXYCODONE HYDROCHLORIDE AND ACETAMINOPHEN 5; 325 MG/1; MG/1
1 TABLET ORAL EVERY 4 HOURS PRN
Status: DISCONTINUED | OUTPATIENT
Start: 2021-09-04 | End: 2021-09-04

## 2021-09-04 RX ORDER — DIVALPROEX SODIUM 125 MG/1
250 CAPSULE, COATED PELLETS ORAL EVERY 8 HOURS SCHEDULED
Status: DISCONTINUED | OUTPATIENT
Start: 2021-09-04 | End: 2021-09-05 | Stop reason: HOSPADM

## 2021-09-04 RX ORDER — TRAZODONE HYDROCHLORIDE 50 MG/1
50 TABLET ORAL NIGHTLY PRN
Status: DISCONTINUED | OUTPATIENT
Start: 2021-09-04 | End: 2021-09-05 | Stop reason: HOSPADM

## 2021-09-04 RX ORDER — OXYCODONE HYDROCHLORIDE 5 MG/1
10 TABLET ORAL EVERY 4 HOURS PRN
Status: DISCONTINUED | OUTPATIENT
Start: 2021-09-04 | End: 2021-09-05 | Stop reason: HOSPADM

## 2021-09-04 RX ORDER — HALOPERIDOL 5 MG/ML
0.5 INJECTION INTRAMUSCULAR EVERY 30 MIN PRN
Status: DISCONTINUED | OUTPATIENT
Start: 2021-09-04 | End: 2021-09-05 | Stop reason: HOSPADM

## 2021-09-04 RX ADMIN — ENOXAPARIN SODIUM 40 MG: 40 INJECTION SUBCUTANEOUS at 21:56

## 2021-09-04 RX ADMIN — SODIUM CHLORIDE: 9 INJECTION, SOLUTION INTRAVENOUS at 22:30

## 2021-09-04 RX ADMIN — DIVALPROEX SODIUM 250 MG: 125 CAPSULE ORAL at 22:06

## 2021-09-04 RX ADMIN — TRAZODONE HYDROCHLORIDE 50 MG: 50 TABLET ORAL at 21:57

## 2021-09-04 RX ADMIN — ATORVASTATIN CALCIUM 40 MG: 40 TABLET, FILM COATED ORAL at 21:57

## 2021-09-04 RX ADMIN — OXYCODONE HYDROCHLORIDE AND ACETAMINOPHEN 1 TABLET: 5; 325 TABLET ORAL at 17:04

## 2021-09-04 RX ADMIN — OXYCODONE 5 MG: 5 TABLET ORAL at 21:57

## 2021-09-04 RX ADMIN — ACETAMINOPHEN 1000 MG: 500 TABLET, FILM COATED ORAL at 21:56

## 2021-09-04 RX ADMIN — DONEPEZIL HYDROCHLORIDE 10 MG: 10 TABLET, FILM COATED ORAL at 21:57

## 2021-09-04 ASSESSMENT — PAIN DESCRIPTION - ORIENTATION: ORIENTATION: RIGHT

## 2021-09-04 ASSESSMENT — ENCOUNTER SYMPTOMS
COUGH: 0
BACK PAIN: 0
NAUSEA: 0
DIARRHEA: 0
VOMITING: 0
ABDOMINAL PAIN: 0
SHORTNESS OF BREATH: 0

## 2021-09-04 ASSESSMENT — PAIN SCALES - GENERAL
PAINLEVEL_OUTOF10: 0
PAINLEVEL_OUTOF10: 8
PAINLEVEL_OUTOF10: 6
PAINLEVEL_OUTOF10: 8

## 2021-09-04 ASSESSMENT — PAIN DESCRIPTION - LOCATION: LOCATION: HIP

## 2021-09-04 ASSESSMENT — PAIN DESCRIPTION - PROGRESSION: CLINICAL_PROGRESSION: NOT CHANGED

## 2021-09-04 ASSESSMENT — PAIN DESCRIPTION - PAIN TYPE: TYPE: ACUTE PAIN

## 2021-09-04 ASSESSMENT — PAIN DESCRIPTION - FREQUENCY: FREQUENCY: CONTINUOUS

## 2021-09-04 ASSESSMENT — PAIN DESCRIPTION - DESCRIPTORS: DESCRIPTORS: ACHING

## 2021-09-04 ASSESSMENT — PAIN DESCRIPTION - ONSET: ONSET: ON-GOING

## 2021-09-04 NOTE — PROGRESS NOTES
Consult received  Right periprosthetic greater trochanter fracture    No clear evidence of loose prosthesis at this time  CT pending to evaluate for extension to the prosthesis    For now:  - NWB RLE  - Avoid active abduction  - No surgical plans at this time    Will evaluate further pending CT and will see later this evening. Full note to follow        JONNY Calhoun MD  [facility]  Office: [phone]  Cell: [phone]    09/04/21  5:49 PM

## 2021-09-04 NOTE — ED NOTES
Pt discharged to home. IV removed, tip intact and pressure applied. Pt given discharge instructions and verbalized understanding. Pt ambulatory at discharge and left without incident.       Scarlet Gaytan RN  09/04/21 6659

## 2021-09-04 NOTE — H&P
Hospital Medicine History & Physical      PCP: Lary Devi MD    Date of Admission: 9/4/2021    Date of Service: Pt seen/examined on 9/4/2021 and Admitted to Inpatient with expected LOS greater than two midnights due to medical therapy. Chief Complaint: Unwitnessed fall at nursing home      History Of Present Illness: Patient is a 27-year-old gentleman with a history of dementia with mood disturbances, hyperlipidemia, seizure disorder resident of [address] [place] [address] came into ER after he had a unwitnessed fall. Patient is alert to self and he knows that he is in the hospital but cannot exactly tell me why he is in the hospital.  Per ED report patient was found in the floor calling for help. And he was complaining of some right hip pain. Son Medhat Agarwal) at bedside who is also POA reported that patient is in the memory care unit of the facility under 24/7 supervision due to his mood disturbances. He is able to ambulate with walker and wheelchair. He appears to be stable in terms of his mentation. On arrival hemodynamically stable. EKG not available in epic to review but per ED physician normal sinus rhythm. He is in bradycardia heart rate in low 50s during my evaluation on heart monitor. CT head, CT cervical spine no acute pathology    X-ray hip acute comminuted and distracted fracture right femoral greater trochanteric. Chest x-ray right middle lobe airspace disease suspicious for pneumonia. Patient had a CT chest done in March with which showed right middle lobe airspace disease. He has a history of Covid pneumonia in January per report he is vaccinated for COVID-19.           Past Medical History:          Diagnosis Date    Allergic rhinitis     Back pain     CAD (coronary artery disease)     Dementia ([facility] [address]     Hyperlipidemia        Past Surgical History:          Procedure Laterality Date    APPENDECTOMY      CHOLECYSTECTOMY      CORONARY ANGIOPLASTY WITH STENT PLACEMENT 05/2017    Stent X 1    JOINT REPLACEMENT Bilateral 1994    TONSILLECTOMY         Medications Prior to Admission:      Prior to Admission medications    Medication Sig Start Date End Date Taking? Authorizing Provider   divalproex (DEPAKOTE SPRINKLE) 125 MG capsule Take 2 capsules by mouth every 8 hours 3/16/21   Anum Mccarthy MD   acetaminophen (TYLENOL) 325 MG tablet Take 650 mg by mouth every 6 hours as needed for Pain    Historical Provider, MD   clopidogrel (PLAVIX) 75 MG tablet Take 75 mg by mouth daily    Historical Provider, MD   aluminum & magnesium hydroxide-simethicone (MAALOX) 200-200-20 MG/5ML SUSP suspension Take 30 mLs by mouth every 4 hours as needed for Indigestion    Historical Provider, MD   metoprolol tartrate (LOPRESSOR) 25 MG tablet Take 25 mg by mouth daily    Historical Provider, MD   magnesium hydroxide (MILK OF MAGNESIA) 400 MG/5ML suspension Take 30 mLs by mouth daily as needed for Constipation    Historical Provider, MD   polyethylene glycol (GLYCOLAX) 17 g packet Take 17 g by mouth daily    Historical Provider, MD   traZODone (DESYREL) 50 MG tablet Take 25 mg by mouth nightly    Historical Provider, MD   vitamin D (CHOLECALCIFEROL) 25 MCG (1000 UT) TABS tablet Take 5,000 Units by mouth daily    Historical Provider, MD   donepezil (ARICEPT) 5 MG tablet TAKE ONE TABLET BY MOUTH ONCE NIGHTLY  Patient taking differently: Take 10 mg by mouth nightly  8/21/20   Hilary Guzman MD   atorvastatin (LIPITOR) 40 MG tablet TAKE ONE TABLET BY MOUTH ONCE NIGHTLY 8/21/20   Hilary Guzman MD   pantoprazole (PROTONIX) 40 MG tablet 1 tab po daily 12/11/19   Hilary Guzman MD       Allergies:  Pcn [penicillins]    Social History:      The patient currently lives nursing home uses walker, wheelchair    TOBACCO:   reports that he quit smoking about 57 years ago.  He has never used smokeless tobacco.  ETOH:   reports current alcohol use of about 1.0 standard drinks of alcohol per week.      Family History:      Reviewed in detail and negative for DM, CAD, Cancer, CVA. :    History reviewed. No pertinent family history. REVIEW OF SYSTEMS:   Pertinent positives as noted in the HPI. All other systems reviewed and negative. PHYSICAL EXAM PERFORMED:    BP (!) 163/78   Pulse 56   Temp 98.2 °F (36.8 °C) (Oral)   Resp 14   Ht 6' (1.829 m)   Wt 177 lb (80.3 kg)   SpO2 99%   BMI 24.01 kg/m²     General appearance: Elderly, demented no apparent distress, appears stated age and cooperative. HEENT:  Normal cephalic, atraumatic without obvious deformity. Pupils equal, round, and reactive to light. Extra ocular muscles intact. Conjunctivae/corneas clear. Neck: Supple, with full range of motion. No jugular venous distention. Trachea midline. Respiratory:  Normal respiratory effort. Clear to auscultation, bilaterally without Rales/Wheezes/Rhonchi. Cardiovascular: Bradycardia, regular  rhythm with normal S1/S2 without murmurs, rubs or gallops. Abdomen: Soft, non-tender, non-distended with normal bowel sounds. Musculoskeletal: Painful range of motion on right lower extremity. No lower extremity edema  Skin: Skin color, texture, turgor normal.  No rashes or lesions. Neurologic: Alert oriented x2. He knows he is in the hospital but cannot tell me the name. He is a dementia. Normal speech, face symmetric, bilateral PERRLA, able to move both upper extremities above gravity, unable to determine the strength on the right lower extremity due to pain. Left lower extremity motor 4+  Psychiatric:  Alert and oriented to self and knows in the hospital but cannot tell me the name. Demented. Capillary Refill: Brisk,< 3 seconds   Peripheral Pulses: +2 palpable, equal bilaterally       Labs:     No results for input(s): WBC, HGB, HCT, PLT in the last 72 hours. No results for input(s): NA, K, CL, CO2, BUN, CREATININE, CALCIUM, PHOS in the last 72 hours.     Invalid input(s): MAGNES  No results for input(s): AST, ALT, BILIDIR, BILITOT, ALKPHOS in the last 72 hours. No results for input(s): INR in the last 72 hours. No results for input(s): Devin Oiler in the last 72 hours. Urinalysis:      Lab Results   Component Value Date    NITRU Negative 03/14/2021    WBCUA 0-2 03/14/2021    RBCUA 3-4 03/14/2021    BLOODU Negative 03/14/2021    SPECGRAV 1.020 03/14/2021    GLUCOSEU Negative 03/14/2021       Radiology:     CXR: I have reviewed the CXR with the following interpretation: See radiology report. EKG:  I have reviewed the EKG with the following interpretation: Not available in epic. But per ED physician normal sinus rhythm. No acute changes. CT Head WO Contrast   Final Result      1. No skull fracture or evidence of acute intracranial abnormality   2. Stable moderate atrophy and chronic small vessel ischemia      CT CERVICAL SPINE WO CONTRAST   Final Result   Impression:   1. No acute fracture. 2. Degenerative changes cervical spine. XR HIP BILATERAL W AP PELVIS (2 VIEWS)   Final Result   Impression:   1. Acute comminuted and distracted fracture right femoral greater trochanter. 2. Bilateral hip prosthesis. 3. Paget's disease. XR CHEST (2 VW)   Final Result         1. Right middle lobe airspace disease suspicious for pneumonia; correlate for clinical evidence of infection   2. No evidence for acute bone abnormality             CT HIP RIGHT WO CONTRAST    (Results Pending)       ASSESSMENT:    Active Hospital Problems    Diagnosis Date Noted    Hip fracture, right, closed, initial encounter ([facility] [address] [facility] 09/04/2021       #Unwitnessed fall  Due to advanced dementia patient cannot give details.   Check CBC, BMP, pro-Meek, UA  Continue to monitor on telemetry  Patient is DNR CC so we will not pursue for echo at this time    #Right periprosthetic greater trochanteric fracture after a fall  Orthopedic was consulted recommended CT right hip for better evaluation  Nonweightbearing right lower extremity. Pain control with Percocet pain panel    #Chest x-ray right middle lobe airspace disease  We will check procalcitonin, CBC,  Continue to monitor without antibiotics for now. #Advanced dementia with mood disorder  Continue donepezil, Depakote, trazodone  Delirium precautions    #Hypertension  Blood pressure normal.  Hold metoprolol in light of bradycardia    #Patient is on Plavix ? reason  We will hold in anticipation for any surgical intervention. DVT Prophylaxis: Lovenox  Diet: No diet orders on file regular diet n.p.o. after midnight  Code Status: Prior [facility]    PT/OT Eval Status: After orthopedic evaluation    Dispo -inpatient. CT right femur discharge in 1 to 2 days. Orthopedic to reevaluate after CT if no plan for surgery do not make patient n.p.o.       Juan José Burris MD    Thank you Calista García MD for the opportunity to be involved in this patient's care. If you have any questions or concerns please feel free to contact me at [phone].     This chart was likely completed using voice recognition technology and may contain unintended grammatical , phraseology,and/or punctuation errors

## 2021-09-04 NOTE — ED PROVIDER NOTES
ED Attending Attestation Note     Date of evaluation: 9/4/2021    This patient was seen by the advance practice provider. I have seen and examined the patient, agree with the workup, evaluation, management and diagnosis. The care plan has been discussed. I have reviewed the ECG and concur with the SARAH's interpretation. My assessment reveals a pleasant patient with some dementia who lives in the facility who had a fall sometime between last night and this morning. He was found this morning calling for help. He is complaining of right hip and right proximal thigh pain. Sensation to soft touch is intact in the right lower extremity. He has significant tenderness and was found to have a acute right femoral greater trochanter fracture.   Will speak with orthopedics     Judy Liu MD  09/04/21 7729

## 2021-09-04 NOTE — ED PROVIDER NOTES
[facility]  EMERGENCY DEPARTMENT ENCOUNTER          PHYSICIAN ASSISTANT NOTE       Date of evaluation: 9/4/2021    Chief Complaint     Fall      History of Present Illness     Mariann Holter is a 80 y.o. male with a history of coronary artery disease, high cholesterol, dementia, hyperlipidemia who comes to the emergency department by squad today from his nursing facility where he reportedly had an unwitnessed fall. The facility reported that they found him down on the ground after calling for help. When they arrived in his room and attempted to help him up he complained of right hip pain and was requesting to go to the hospital.  They noted no obvious injuries or bleeding. The patient is currently on Plavix. The facility confirms that the patient is at his baseline mental status. On arrival the patient has no complaints and reported that he thinks he fell yesterday but is unsure. He denies any pain. He endorses a mild headache but otherwise denies any other symptoms including cough, shortness of breath, chest pain or pain elsewhere in his body, abdominal pain, nausea, vomiting, change in bowel or bladder function. Review of Systems     Review of Systems   Constitutional: Negative for fever. Respiratory: Negative for cough and shortness of breath. Cardiovascular: Negative for chest pain. Gastrointestinal: Negative for abdominal pain, diarrhea, nausea and vomiting. Genitourinary: Negative for dysuria. Musculoskeletal: Negative for back pain and neck pain. Neurological: Positive for headaches. Past Medical, Surgical, Family, and Social History     He has a past medical history of Allergic rhinitis, Back pain, CAD (coronary artery disease), Dementia ([address] and Hyperlipidemia. He has a past surgical history that includes joint replacement (Bilateral, 1994); Cholecystectomy; Appendectomy; Tonsillectomy; and Coronary angioplasty with stent (05/2017).   His family history is not on file.  He reports that he quit smoking about 57 years ago. He has never used smokeless tobacco. He reports current alcohol use of about 1.0 standard drinks of alcohol per week. He reports that he does not use drugs. Medications     Previous Medications    ACETAMINOPHEN (TYLENOL) 325 MG TABLET    Take 650 mg by mouth every 6 hours as needed for Pain    ALUMINUM & MAGNESIUM HYDROXIDE-SIMETHICONE (MAALOX) 200-200-20 MG/5ML SUSP SUSPENSION    Take 30 mLs by mouth every 4 hours as needed for Indigestion    ATORVASTATIN (LIPITOR) 40 MG TABLET    TAKE ONE TABLET BY MOUTH ONCE NIGHTLY    CLOPIDOGREL (PLAVIX) 75 MG TABLET    Take 75 mg by mouth daily    DIVALPROEX (DEPAKOTE SPRINKLE) 125 MG CAPSULE    Take 2 capsules by mouth every 8 hours    DONEPEZIL (ARICEPT) 5 MG TABLET    TAKE ONE TABLET BY MOUTH ONCE NIGHTLY    MAGNESIUM HYDROXIDE (MILK OF MAGNESIA) 400 MG/5ML SUSPENSION    Take 30 mLs by mouth daily as needed for Constipation    METOPROLOL TARTRATE (LOPRESSOR) 25 MG TABLET    Take 25 mg by mouth daily    PANTOPRAZOLE (PROTONIX) 40 MG TABLET    1 tab po daily    POLYETHYLENE GLYCOL (GLYCOLAX) 17 G PACKET    Take 17 g by mouth daily    TRAZODONE (DESYREL) 50 MG TABLET    Take 25 mg by mouth nightly    VITAMIN D (CHOLECALCIFEROL) 25 MCG (1000 UT) TABS TABLET    Take 5,000 Units by mouth daily       Allergies     He is allergic to pcn [penicillins]. Physical Exam     INITIAL VITALS: BP: (!) 150/70, Temp: 98.2 °F (36.8 °C), Pulse: 50, Resp: 13, SpO2: 100 %  Physical Exam  Constitutional:       General: He is not in acute distress. Appearance: He is not ill-appearing or toxic-appearing. HENT:      Head: Normocephalic and atraumatic. Nose: Nose normal.   Cardiovascular:      Rate and Rhythm: Bradycardia present. Pulses: Normal pulses. Heart sounds: Normal heart sounds. No murmur heard. No friction rub. No gallop.     Pulmonary:      Effort: Pulmonary effort is normal.      Breath sounds: Normal baseline for this patient secondary to dementia. Pulse is 50. All other vital signs are within normal limits. On exam there are no obvious injuries to the patient's face, scalp, trunk, extremities including deformities, lacerations, hematomas or abrasions. There is no tenderness to palpation of the patient's extremities, chest, abdomen. There is no tenderness to palpation of the cervical, thoracic or lumbar midline spine. Patient's pelvis is stable. There is no tenderness to palpation of the pelvis or hips bilaterally. There is no shortening or out turning of the patient's lower extremities. The patient does exhibit a mild pain response with abduction of the right hip. Patient was administered Percocet for pain. EKG shows sinus bradycardia at a rate of 52 bpm without ST elevation or depression present. There are no other signs of ischemia present. ID interval is 156. QT is 460. CT head and CT C-spine were unremarkable. CXR showed:  1. Right middle lobe airspace disease suspicious for pneumonia; correlate for clinical evidence of infection   2. No evidence for acute bone abnormality       Xray bilateral hips and pelvis:  1. Acute comminuted and distracted fracture right femoral greater trochanter. 2. Bilateral hip prosthesis. 3. Paget's disease. Orthopedics was consulted and recommended that the patient be admitted for further evaluation and pain control. They recommended a CT right hip to further evaluate the injury. They advised that they will come and see the patient tonight or tomorrow after he is admitted. CT right hip was ordered and the patient was admitted to medicine with the above plan in place. This patient was also evaluated by the attending physician. All care plans were discussed and agreed upon. BMP, CBC, procalcitonin were ordered prior to admission. Clinical Impression     1.  Closed fracture of right hip, initial encounter [facility])        Disposition

## 2021-09-04 NOTE — PROGRESS NOTES
Clinical Pharmacy Progress Note  Medication History     Admit Date: 9/4/21    List of of current medications patient is taking is complete. Home Medication list in EPIC updated to reflect changes noted below. Source of information: [facility] order summary report (Ph: [phone])    Changes made to medication list:     Medication doses / instructions adjusted:   Donepezil 5 mg --> donepezil 10 mg  Trazodone 25 mg nightly --> 50 mg nightly    Complete Home Medication List:  Medication Sig    donepezil (ARICEPT) 10 MG tablet Take 10 mg by mouth nightly    divalproex (DEPAKOTE SPRINKLE) 125 MG capsule Take 2 capsules by mouth every 8 hours    acetaminophen (TYLENOL) 325 MG tablet Take 650 mg by mouth every 6 hours as needed for Pain    clopidogrel (PLAVIX) 75 MG tablet Take 75 mg by mouth daily    aluminum & magnesium hydroxide-simethicone (MAALOX) 200-200-20 MG/5ML SUSP suspension Take 30 mLs by mouth every 4 hours as needed for Indigestion    metoprolol tartrate (LOPRESSOR) 25 MG tablet Take 25 mg by mouth daily    magnesium hydroxide (MILK OF MAGNESIA) 400 MG/5ML suspension Take 30 mLs by mouth daily as needed for Constipation    polyethylene glycol (GLYCOLAX) 17 g packet Take 17 g by mouth daily as needed for Constipation     traZODone (DESYREL) 50 MG tablet Take 50 mg by mouth nightly as needed for Sleep     vitamin D (CHOLECALCIFEROL) 25 MCG (1000 UT) TABS tablet Take 5,000 Units by mouth daily    atorvastatin (LIPITOR) 40 MG tablet TAKE ONE TABLET BY MOUTH ONCE NIGHTLY    pantoprazole (PROTONIX) 40 MG tablet 1 tab po daily       Please call with questions!     [address] [facility] [address]  9/4/2021 6:29 PM

## 2021-09-04 NOTE — ED NOTES
Bed: B14-14  Expected date:   Expected time:   Means of arrival:   Comments:  410 Jane Smiley RN  09/04/21 9100

## 2021-09-04 NOTE — ED TRIAGE NOTES
Pt presents to ED via EMS for fall. Pt was found on floor of [facility]. Pt states fall was not from today, but does not remember when he fell. Pt denies pain at this time. Sheet pelvic binder in place upon arrival by EMS.

## 2021-09-05 ENCOUNTER — APPOINTMENT (OUTPATIENT)
Dept: GENERAL RADIOLOGY | Age: 86
DRG: 536 | End: 2021-09-05
Payer: MEDICARE

## 2021-09-05 VITALS
DIASTOLIC BLOOD PRESSURE: 69 MMHG | HEIGHT: 72 IN | BODY MASS INDEX: 23.98 KG/M2 | HEART RATE: 71 BPM | OXYGEN SATURATION: 94 % | TEMPERATURE: 98.3 F | RESPIRATION RATE: 14 BRPM | WEIGHT: 177 LBS | SYSTOLIC BLOOD PRESSURE: 119 MMHG

## 2021-09-05 LAB
A/G RATIO: 0.8 (ref 1.1–2.2)
ALBUMIN SERPL-MCNC: 2.6 G/DL (ref 3.4–5)
ALP BLD-CCNC: 256 U/L (ref 40–129)
ALT SERPL-CCNC: 6 U/L (ref 10–40)
ANION GAP SERPL CALCULATED.3IONS-SCNC: 8 MMOL/L (ref 3–16)
AST SERPL-CCNC: 12 U/L (ref 15–37)
BASOPHILS ABSOLUTE: 0 K/UL (ref 0–0.2)
BASOPHILS RELATIVE PERCENT: 0.5 %
BILIRUB SERPL-MCNC: 0.4 MG/DL (ref 0–1)
BUN BLDV-MCNC: 30 MG/DL (ref 7–20)
CALCIUM SERPL-MCNC: 8.8 MG/DL (ref 8.3–10.6)
CHLORIDE BLD-SCNC: 104 MMOL/L (ref 99–110)
CO2: 27 MMOL/L (ref 21–32)
CREAT SERPL-MCNC: 0.9 MG/DL (ref 0.8–1.3)
EKG ATRIAL RATE: 52 BPM
EKG DIAGNOSIS: NORMAL
EKG P AXIS: 65 DEGREES
EKG P-R INTERVAL: 156 MS
EKG Q-T INTERVAL: 460 MS
EKG QRS DURATION: 90 MS
EKG QTC CALCULATION (BAZETT): 427 MS
EKG R AXIS: 52 DEGREES
EKG T AXIS: 85 DEGREES
EKG VENTRICULAR RATE: 52 BPM
EOSINOPHILS ABSOLUTE: 0.1 K/UL (ref 0–0.6)
EOSINOPHILS RELATIVE PERCENT: 1.7 %
GFR AFRICAN AMERICAN: >60
GFR NON-AFRICAN AMERICAN: >60
GLOBULIN: 3.3 G/DL
GLUCOSE BLD-MCNC: 97 MG/DL (ref 70–99)
HCT VFR BLD CALC: 31.3 % (ref 40.5–52.5)
HEMOGLOBIN: 10.6 G/DL (ref 13.5–17.5)
LYMPHOCYTES ABSOLUTE: 1.6 K/UL (ref 1–5.1)
LYMPHOCYTES RELATIVE PERCENT: 19.5 %
MCH RBC QN AUTO: 33.5 PG (ref 26–34)
MCHC RBC AUTO-ENTMCNC: 33.8 G/DL (ref 31–36)
MCV RBC AUTO: 98.9 FL (ref 80–100)
MONOCYTES ABSOLUTE: 0.7 K/UL (ref 0–1.3)
MONOCYTES RELATIVE PERCENT: 8.5 %
NEUTROPHILS ABSOLUTE: 5.7 K/UL (ref 1.7–7.7)
NEUTROPHILS RELATIVE PERCENT: 69.8 %
PDW BLD-RTO: 13.2 % (ref 12.4–15.4)
PLATELET # BLD: 129 K/UL (ref 135–450)
PMV BLD AUTO: 7.5 FL (ref 5–10.5)
POTASSIUM REFLEX MAGNESIUM: 4.1 MMOL/L (ref 3.5–5.1)
RBC # BLD: 3.16 M/UL (ref 4.2–5.9)
SODIUM BLD-SCNC: 139 MMOL/L (ref 136–145)
TOTAL PROTEIN: 5.9 G/DL (ref 6.4–8.2)
WBC # BLD: 8.2 K/UL (ref 4–11)

## 2021-09-05 PROCEDURE — 97535 SELF CARE MNGMENT TRAINING: CPT

## 2021-09-05 PROCEDURE — 97166 OT EVAL MOD COMPLEX 45 MIN: CPT

## 2021-09-05 PROCEDURE — 85025 COMPLETE CBC W/AUTO DIFF WBC: CPT

## 2021-09-05 PROCEDURE — 97530 THERAPEUTIC ACTIVITIES: CPT

## 2021-09-05 PROCEDURE — 6370000000 HC RX 637 (ALT 250 FOR IP): Performed by: INTERNAL MEDICINE

## 2021-09-05 PROCEDURE — 97162 PT EVAL MOD COMPLEX 30 MIN: CPT

## 2021-09-05 PROCEDURE — G0378 HOSPITAL OBSERVATION PER HR: HCPCS

## 2021-09-05 PROCEDURE — 80053 COMPREHEN METABOLIC PANEL: CPT

## 2021-09-05 PROCEDURE — 73060 X-RAY EXAM OF HUMERUS: CPT

## 2021-09-05 PROCEDURE — 97116 GAIT TRAINING THERAPY: CPT

## 2021-09-05 PROCEDURE — 36415 COLL VENOUS BLD VENIPUNCTURE: CPT

## 2021-09-05 RX ORDER — OXYCODONE HYDROCHLORIDE 5 MG/1
5 TABLET ORAL EVERY 8 HOURS PRN
Qty: 9 TABLET | Refills: 0 | Status: SHIPPED | OUTPATIENT
Start: 2021-09-05 | End: 2021-09-08

## 2021-09-05 RX ORDER — CLOPIDOGREL BISULFATE 75 MG/1
75 TABLET ORAL DAILY
Status: DISCONTINUED | OUTPATIENT
Start: 2021-09-05 | End: 2021-09-05 | Stop reason: HOSPADM

## 2021-09-05 RX ADMIN — DIVALPROEX SODIUM 250 MG: 125 CAPSULE ORAL at 13:24

## 2021-09-05 RX ADMIN — OXYCODONE 10 MG: 5 TABLET ORAL at 09:03

## 2021-09-05 RX ADMIN — PANTOPRAZOLE SODIUM 40 MG: 40 TABLET, DELAYED RELEASE ORAL at 07:21

## 2021-09-05 RX ADMIN — CLOPIDOGREL BISULFATE 75 MG: 75 TABLET ORAL at 13:25

## 2021-09-05 RX ADMIN — Medication 5000 UNITS: at 09:03

## 2021-09-05 RX ADMIN — OXYCODONE 5 MG: 5 TABLET ORAL at 13:25

## 2021-09-05 RX ADMIN — DIVALPROEX SODIUM 250 MG: 125 CAPSULE ORAL at 07:21

## 2021-09-05 RX ADMIN — ACETAMINOPHEN 1000 MG: 500 TABLET, FILM COATED ORAL at 13:24

## 2021-09-05 ASSESSMENT — PAIN SCALES - GENERAL
PAINLEVEL_OUTOF10: 7
PAINLEVEL_OUTOF10: 5
PAINLEVEL_OUTOF10: 0

## 2021-09-05 ASSESSMENT — PAIN DESCRIPTION - PROGRESSION
CLINICAL_PROGRESSION: NOT CHANGED

## 2021-09-05 NOTE — PROGRESS NOTES
4 Eyes Admission Assessment     I agree as the admission nurse that 2 RN's have performed a thorough Head to Toe Skin Assessment on the patient. ALL assessment sites listed below have been assessed on admission. Areas assessed by both nurses:   [x]   Head, Face, and Ears   [x]   Shoulders, Back, and Chest  [x]   Arms, Elbows, and Hands   [x]   Coccyx, Sacrum, and Ischium  [x]   Legs, Feet, and Heels        Does the Patient have Skin Breakdown?  Laceration on LUE        Arik Prevention initiated:  No   Wound Care Orders initiated:  No      [facility] nurse consulted for Pressure Injury (Stage 3,4, Unstageable, DTI, NWPT, and Complex wounds) or Arik score 18 or lower:  No      Nurse 1 eSignature: Electronically signed by Lina Gutierrez RN on 9/5/21 at 4:52 AM EDT    **SHARE this note so that the co-signing nurse is able to place an eSignature**    Nurse 2 eSignature: {Esignature:[id number]}

## 2021-09-05 NOTE — PLAN OF CARE
Problem: Falls - Risk of:  Goal: Will remain free from falls  Description: Will remain free from falls  9/5/2021 0739 by Michael Gabriel RN  Outcome: Ongoing  9/5/2021 0447 by Mannie New RN  Outcome: Ongoing  Goal: Absence of physical injury  Description: Absence of physical injury  Outcome: Ongoing     Problem: Pain:  Goal: Pain level will decrease  Description: Pain level will decrease  9/5/2021 0739 by Michael Gabriel RN  Outcome: Ongoing  9/5/2021 0447 by Mannie New RN  Outcome: Ongoing  Goal: Control of acute pain  Description: Control of acute pain  Outcome: Ongoing  Goal: Control of chronic pain  Description: Control of chronic pain  Outcome: Ongoing     Problem: Skin Integrity:  Goal: Will show no infection signs and symptoms  Description: Will show no infection signs and symptoms  Outcome: Ongoing  Goal: Absence of new skin breakdown  Description: Absence of new skin breakdown  Outcome: Ongoing

## 2021-09-05 NOTE — PROGRESS NOTES
Pt left with transport at this time in stable condition. IV taken out and bandage over site, pt's belongings packed and given to transport to take with them. Paperwork including the pt's prescription for pain medication went with him too.  Michael Gabriel RN

## 2021-09-05 NOTE — CARE COORDINATION
Case Management Assessment            Discharge Note                    Date / Time of Note: 9/5/2021 2:17 PM                  Discharge Note Completed by: Flaca Ashton RN     [phone]. [facility] will pick him up at 1700 to return to his care facility. Patient Name: Clari Hamlin   YOB: 1932  Diagnosis: Closed fracture of right hip, initial encounter [facility]) [S72.001A]  Hip fracture, right, closed, initial encounter [facility]) Serenity Mike   Date / Time: 9/4/2021  3:02 PM    Current PCP: Renae Hare MD  Clinic patient: No    Hospitalization in the last 30 days: No    Advance Directives:  Code Status: [facility] DNR form completed and on chart: No    Financial:  Payor: Sejal Nicole / Plan: Jeanna Nettles / Product Type: *No Product type* /      Pharmacy:    [facility] [address], [address] [facility]  [address] [facility]  Phone: [phone] Fax: [phone]      Assistance purchasing medications?: Potential Assistance Purchasing Medications: No  Assistance provided by Case Management: None at this time    Does patient want to participate in local refill/ meds to beds program?: No    Meds To Beds General Rules:  1. Can ONLY be done Monday- Friday between 8:30am-5pm  2. Prescription(s) must be in pharmacy by 3pm to be filled same day  3. Copy of patient's insurance/ prescription drug card and patient face sheet must be sent along with the prescription(s)  4. Cost of Rx cannot be added to hospital bill. If financial assistance is needed, please contact unit  or ;  or  CANNOT provide pharmacy voucher for patients co-pays  5.  Patients can then  the prescription on their way out of the hospital at discharge, or pharmacy can deliver to the bedside if staff is available. (payment due at time of pick-up or delivery - cash, check, or card accepted)     Able to afford home medications/ co-pay costs: Yes    ADLS:  Current PT AM-PAC Score:   /24  Current OT AM-PAC Score:   /24      DISCHARGE Disposition: Kristian (LTC): Ashley Young  Phone: [phone]  Fax: na    LOC at discharge: [address] Completed: Yes    Notification completed in HENS/PAS?:  Not Applicable    IMM Completed:   Not Indicated    Transportation:  Transportation PLAN for discharge: EMS transportation   Mode of Transport: Ambulance stretcher - BLS  Reason for medical transport: Other: dementia  Name of [address]: [address]  Phone: [phone]  Time of Transport: [employer] form completed: Yes    The Plan for Transition of Care is related to the following treatment goals of Closed fracture of right hip, initial encounter ([facility] [address] [S72.001A]  Hip fracture, right, closed, initial encounter ([facility] [address] [S72.001A]    The Patient and/or patient representative Elzbieta Mcelroy and his family were provided with a choice of provider and agrees with the discharge plan Yes    Freedom of choice list was provided with basic dialogue that supports the patient's individualized plan of care/goals and shares the quality data associated with the providers.  Yes    Care Transitions patient: No    Vee Bailon RN  [facility] [employer]  Case Management Department

## 2021-09-05 NOTE — PROGRESS NOTES
Hospitalist Progress Note      PCP: Sharda Espino MD    Date of Admission: 9/4/2021    Chief Complaint: fall     Hospital Course: Patient is a 41-year-old gentleman with a history of dementia with mood disturbances, hyperlipidemia, seizure disorder [facility] after he had a unwitnessed fall. Patient is alert to self and he knows that he is in the hospital but cannot exactly tell me why he is in the hospital.  Per ED report patient was found in the floor calling for help. And he was complaining of some right hip pain. CT head, CT cervical spine no acute pathology     X-ray hip acute comminuted and distracted fracture right femoral greater trochanteric. Orthopedic was consulted recommend CT right hip showed 7 mm displacement. Orthopedic recommended partial weightbearing and repeat imaging in 1 week. Chest x-ray right middle lobe airspace disease suspicious for pneumonia. Patient had a CT chest done in March with which showed right middle lobe airspace disease.     He has a history of Covid pneumonia in January per report he is vaccinated for COVID-19. Subjective: Patient is seen and examined today. Pleasantly confused. Denies any pain, nausea, vomiting.   No acute event reported overnight      Medications:  Reviewed    Infusion Medications    sodium chloride       Scheduled Medications    atorvastatin  40 mg Oral Nightly    divalproex  250 mg Oral 3 times per day    donepezil  10 mg Oral Nightly    pantoprazole  40 mg Oral QAM AC    vitamin D  5,000 Units Oral Daily    sodium chloride flush  5-40 mL IntraVENous 2 times per day    enoxaparin  40 mg SubCUTAneous Daily    acetaminophen  1,000 mg Oral Q8H     PRN Meds: aluminum & magnesium hydroxide-simethicone, traZODone, sodium chloride flush, sodium chloride, ondansetron **OR** ondansetron, polyethylene glycol, acetaminophen **OR** acetaminophen, haloperidol lactate, oxyCODONE **OR** oxyCODONE      Intake/Output Summary (Last 24 hours) at 9/5/2021 1233  Last data filed at 9/5/2021 1115  Gross per 24 hour   Intake 120 ml   Output 250 ml   Net -130 ml       Physical Exam Performed:    /69   Pulse 60   Temp 98.3 °F (36.8 °C) (Oral)   Resp 14   Ht 6' (1.829 m)   Wt 177 lb (80.3 kg)   SpO2 94%   BMI 24.01 kg/m²     General appearance: Elderly, demented no apparent distress, appears stated age and cooperative. HEENT: Pupils equal, round, and reactive to light. Conjunctivae/corneas clear. Neck: Supple, with full range of motion. No jugular venous distention. Trachea midline. Respiratory:  Normal respiratory effort. Clear to auscultation, bilaterally without Rales/Wheezes/Rhonchi. Cardiovascular: Regular rate and rhythm with normal S1/S2 without murmurs, rubs or gallops. Abdomen: Soft, non-tender, non-distended with normal bowel sounds. Musculoskeletal: Painful motion in right leg. Skin: Skin color, texture, turgor normal.  No rashes or lesions. Neurologic:  Neurovascularly intact without any focal sensory/motor deficits. Cranial nerves: II-XII intact, grossly non-focal.  Psychiatric: Alert and orientedx2, demented  Capillary Refill: Brisk,< 3 seconds   Peripheral Pulses: +2 palpable, equal bilaterally       Labs:   Recent Labs     09/04/21 1817 09/05/21  0542   WBC 9.2 8.2   HGB 12.5* 10.6*   HCT 37.9* 31.3*    129*     Recent Labs     09/04/21 1817 09/05/21  0542    139   K 4.5 4.1    104   CO2 25 27   BUN 32* 30*   CREATININE 0.9 0.9   CALCIUM 9.4 8.8     Recent Labs     09/05/21  0542   AST 12*   ALT 6*   BILITOT 0.4   ALKPHOS 256*     No results for input(s): INR in the last 72 hours. No results for input(s): Ramone Favre in the last 72 hours.     Urinalysis:      Lab Results   Component Value Date    NITRU Negative 09/04/2021    WBCUA 0-2 03/14/2021    RBCUA 3-4 03/14/2021    BLOODU Negative 09/04/2021    SPECGRAV 1.020 09/04/2021    GLUCOSEU Negative 09/04/2021       Radiology:  CT HIP RIGHT WO CONTRAST   Final Result      1. Right total hip arthroplasty with mildly displaced periprosthetic fracture of the greater trochanter. 2.  Paget's disease throughout the pelvis. CT Head WO Contrast   Final Result      1. No skull fracture or evidence of acute intracranial abnormality   2. Stable moderate atrophy and chronic small vessel ischemia      CT CERVICAL SPINE WO CONTRAST   Final Result   Impression:   1. No acute fracture. 2. Degenerative changes cervical spine. XR HIP BILATERAL W AP PELVIS (2 VIEWS)   Final Result   Impression:   1. Acute comminuted and distracted fracture right femoral greater trochanter. 2. Bilateral hip prosthesis. 3. Paget's disease. XR CHEST (2 VW)   Final Result         1. Right middle lobe airspace disease suspicious for pneumonia; correlate for clinical evidence of infection   2. No evidence for acute bone abnormality             XR HUMERUS LEFT (MIN 2 VIEWS)    (Results Pending)           Assessment/Plan:    Active Hospital Problems    Diagnosis Date Noted    Hip fracture, right, closed, initial encounter ([facility] [address]  Hilario 09/04/2021        #Unwitnessed fall  Due to advanced dementia patient cannot give details. Check CBC, BMP, pro-Meek, UA  Continue to monitor on telemetry  Patient is DNR CC so we will not pursue for echo at this time     #Right periprosthetic greater trochanteric fracture after a fall  CT hip mildly displaced periprosthetic fracture evaluated by orthopedic recommended conservative management. #Chest x-ray right middle lobe airspace disease  Concern for active infection.     #Advanced dementia with mood disorder  Continue donepezil, Depakote, trazodone  Delirium precautions     #Hypertension  Blood pressure normal.  Hold metoprolol in light of bradycardia     #We will resume Plavix. DVT Prophylaxis: Lovenox  Diet: ADULT DIET;  Regular  Code Status: DNR-CC    PT/OT Eval Status: Pending    Dispo -discharge pending PT OT eval likely 24 4 8 hours.     Rajat Michael MD

## 2021-09-05 NOTE — PLAN OF CARE
Problem: Falls - Risk of:  Goal: Will remain free from falls  Description: Will remain free from falls  Outcome: Ongoing   Pt has been free from falls this shift, bed alarm on, bed in lowest position, 2/4 side rails up, nonskid socks on, wheels locked, bedside table and call light in reach. Encouraged pt to call out if needed anything. Problem: Pain:  Goal: Pain level will decrease  Description: Pain level will decrease  Outcome: Ongoing   Pt c/o pain in right hip, medicated per mar with prn oxycodone. Pt verbalized relief and is now resting in bed. Will continue to assess pain level.

## 2021-09-05 NOTE — PROGRESS NOTES
Occupational Therapy   Occupational Therapy Initial Assessment and Treatment    Date: 2021   Patient Name: Mariann Holter  MRN: [id number]     : 10/27/1932    Date of Service: 2021    Discharge Recommendations:  Mariann Holter scored a 12 on the AM-PAC ADL Inpatient form. Current research shows that an AM-PAC score of 17 or less is typically not associated with a discharge to the patient's home setting. Based on the patient's AM-PAC score and their current ADL deficits, it is recommended that the patient have 3-5 sessions per week of Occupational Therapy at d/c to increase the patient's independence. Please see assessment section for further patient specific details. If patient discharges prior to next session this note will serve as a discharge summary. Please see below for the latest assessment towards goals. OT Equipment Recommendations  Equipment Needed: No (defer recommendations to discharge facility)    Assessment   Performance deficits / Impairments: Decreased functional mobility ; Decreased ADL status; Decreased balance;Decreased endurance;Decreased cognition  Assessment: Pt is a poor historian 2* dementia. Per pt's son (at bedside), pt is a resident of [facility]. Pt's baseline status is as follows: pt transfers to walker or w/c independently and able to ambulate short distances. Son unsure of ADL baseline. At this time, pt is functioning below recent baseline. Will benefit from continued OT to maximize functional status. Continue per POC. Treatment Diagnosis: impaired ADLs/transfers s/p fall w/ resultant R hip fx (non-operative approach)  Decision Making: Medium Complexity  OT Education: OT Role;Plan of Care;Transfer Training  Patient Education: continue to teach and reinforce  Barriers to Learning: dementia  REQUIRES OT FOLLOW UP: Yes  Activity Tolerance  Activity Tolerance: Patient limited by pain  Safety Devices  Safety Devices in place: Yes  Type of devices: Nurse notified; Left in chair;Chair alarm in place;Call light within reach (son, Marce King, at bedside)           Patient Diagnosis(es): The primary encounter diagnosis was Closed fracture of right hip, initial encounter ([facility] [address] A diagnosis of Hip fracture, right, closed, initial encounter [facility]) was also pertinent to this visit. has a past medical history of Allergic rhinitis, Back pain, CAD (coronary artery disease), Dementia ([facility] [address] and Hyperlipidemia. has a past surgical history that includes joint replacement (Bilateral, 1994); Cholecystectomy; Appendectomy; Tonsillectomy; and Coronary angioplasty with stent (05/2017). Treatment Diagnosis: impaired ADLs/transfers s/p fall w/ resultant R hip fx (non-operative approach)      Restrictions  Position Activity Restriction  Other position/activity restrictions: Ambulate; per Ortho Note: \"partial 50% weight bearing protected with walker. No active abduction right hip\"    Subjective   General  Chart Reviewed: Yes  Patient assessed for rehabilitation services?: Yes  Additional Pertinent Hx: 80 y.o. M to ED s/p an unwitnessed fall (found on floor calling for help). Hospital Course: CT c-spine: neg; CT R Hip: Right total hip arthroplasty with mildly displaced periprosthetic fracture of the greater trochanter; Orthopedic Consult: Appropriate for non-operative management with partial weight bearing protected with walker. No active abduction right hip; L humerus xray: per Dr. Corine Campbell - no fx (formal reading not available). PMH: dementia with mood disturbances, hyperlipidemia, seizure disorder  Family / Caregiver Present: Yes (son, Marce King)  Referring Practitioner: Faviola Todd MD  Diagnosis: Closed Fx R Hip    Subjective  Subjective: In bed on entry. \"It hurts. \" \"You're going to mess me up. \"    Patient Currently in Pain: Yes (pain in LEs (L>R) with mobility, not rated, RN aware)      Social/Functional History  Social/Functional History  Type of Home: Facility ([address] [place] [address])  Ambulation Assistance: Independent (short distances w/ walker; sometimes using w/c)  Transfer Assistance: Independent (bed/wheelchair transfers independently)  Additional Comments: patient is a poor historian, son reported patient able to get up to w/c without assistance at baseline, does ambulate some with cane or FWW       Objective   Vision: Impaired  Vision Exceptions: Wears glasses at all times  Hearing: Exceptions to [facility]  Hearing Exceptions: Hard of hearing/hearing concerns ([place] despite increased volume)      Orientation  Overall Orientation Status: Impaired  Orientation Level: Oriented to person;Oriented to place; Disoriented to time;Disoriented to situation        Functional Mobility  Functional - Mobility Device: Standard Walker  Activity: Other (~3 feet (x2 times))  Assist Level: Minimal assistance  Functional Mobility Comments: Note: assist to navigate/advance walker, cues to keep on task    Toilet Transfers  Toilet - Technique: Stand step  Equipment Used: Standard bedside commode (simulated w/ bedside chair)  Toilet Transfer: Minimal assistance    ADL  LE Dressing: Dependent/Total  Toileting: Dependent/Total (assist for clothing management, assist for placement/removal of urinal; incontinent bladder in depends - hygiene provided and clean depends donned)     Coordination  Movements Are Fluid And Coordinated: Yes        Bed mobility  Supine to Sit: Dependent/Total (of 2)  Scooting: Dependent/Total (Max A of 2 (to scoot forward to EOB))     Transfers  Stand Step Transfers: Minimal assistance (lateral side steps towards HOB (first attempt); stand step transfer bed> chair w/ walker - Min A (second attempt))  Sit to stand:  (Min A of 2 (first attempt from EOB to walker); Min A of 1 (second attempt))  Stand to sit: Moderate assistance (to control descent)        Cognition  Overall Cognitive Status: Exceptions (profoundly [place] making it a challenge to communicate)  Following Commands:  Follows one step commands with repetition  Attention Span: Attends with cues to redirect  Memory: Decreased short term memory;Decreased recall of biographical Information;Decreased recall of recent events;Decreased recall of precautions  Safety Judgement: Decreased awareness of need for assistance  Problem Solving: Decreased awareness of errors  Insights: Decreased awareness of deficits  Initiation: Requires cues for all  Sequencing: Requires cues for some  Cognition Comment: baseline dementia                       LUE Strength  LUE Strength Comment: not formally tested, but [facility] for transfers  RUE Strength  RUE Strength Comment: not formally tested, but [facility] for transfers               Pt seen by OT for eval and treat.  Treatment included: bed mobility, unsupported sitting, transfer, ADL         Plan   Plan  Times per week: 2-5  Times per day: Daily  Current Treatment Recommendations: Functional Mobility Training, Balance Training, Endurance Training, Safety Education & Training, Cognitive Reorientation, Equipment Evaluation, Education, & procurement, Self-Care / ADL                                     AM-PAC Score        AM-[facility] Inpatient Daily Activity Raw Score: 12 (09/05/21 1508)  AM-PAC Inpatient ADL T-Scale Score : 30.6 (09/05/21 1508)  ADL Inpatient CMS 0-100% Score: 66.57 (09/05/21 1508)  ADL Inpatient CMS G-Code Modifier : CL (09/05/21 1508)    Goals  Short term goals  Time Frame for Short term goals: Discharge  Short term goal 1: simulated BSC transfer w/ CGA  Short term goal 2: LB dressing w/ Mod A  Short term goal 3: supine to sit w/ Mod A  Patient Goals   Patient goals : no goal stated       Therapy Time   Individual Concurrent Group Co-treatment   Time In 1428         Time Out 1508         Minutes 40           Timed Code Treatment Minutes:  25  Total Treatment Minutes:  [place] OTR/L #[id number]

## 2021-09-05 NOTE — PROGRESS NOTES
X-ray will be coming to take the pt down for his humerus x ray shortly. PT/OT is aware and will work with him after the x-ray results are back.   Dejah Altamirano RN

## 2021-09-05 NOTE — PROGRESS NOTES
limited by endurance; Patient limited by fatigue;Patient limited by pain       Patient Diagnosis(es): The primary encounter diagnosis was Closed fracture of right hip, initial encounter ([facility] [address] A diagnosis of Hip fracture, right, closed, initial encounter [facility]) was also pertinent to this visit. has a past medical history of Allergic rhinitis, Back pain, CAD (coronary artery disease), Dementia ([facility] [address] and Hyperlipidemia. has a past surgical history that includes joint replacement (Bilateral, 1994); Cholecystectomy; Appendectomy; Tonsillectomy; and Coronary angioplasty with stent (05/2017). Restrictions  Position Activity Restriction  Other position/activity restrictions: Ambulate; per Ortho Note: \"partial 50% weight bearing protected with walker. No active abduction right hip\"  Vision/Hearing  Vision: Impaired  Vision Exceptions: Wears glasses at all times  Hearing: Exceptions to [facility]  Hearing Exceptions: Hard of hearing/hearing concerns     Subjective  General  Chart Reviewed: Yes  Patient assessed for rehabilitation services?: Yes  Additional Pertinent Hx: Patient is an 79 y/o male admitted 9/4 s/p fall at memory care unit. chest x-ray (+) for Right middle lobe airspace disease suspicious for pneumonia. CT head (-) for acute findings. X-ray B hips (+) for Acute comminuted and distracted fracture right femoral greater trochanter. No surgical intervention per ortho. PMH significant for CAD, dementia, B joint replacement. Response To Previous Treatment: Not applicable  Family / Caregiver Present: Yes (son)  Referring Practitioner: Nydia Tony MD  Referral Date : 09/05/21  Diagnosis: closed fracture of right hip  Follows Commands: Within Functional Limits  Other (Comment): with increased time and verbal/tactile cues  General Comment  Comments: Patient supine in bed upon arrival.  Subjective  Subjective: Patient agreeable to PT evaluation with encouragement.   Pain Screening  Patient Currently in Pain: Yes (pain in LEs (L>R) with mobility, not rated, RN aware)  Vital Signs  Patient Currently in Pain: Yes (pain in LEs (L>R) with mobility, not rated, RN aware)       Orientation  Orientation  Overall Orientation Status: Impaired  Orientation Level: Oriented to place;Oriented to person;Disoriented to time;Disoriented to situation (oriented to hospita)  Social/Functional History  Social/Functional History  Type of Home: Facility ([address] [place] [address])  Ambulation Assistance: Independent (short distances w/ walker; sometimes using w/c)  Transfer Assistance: Independent (bed/wheelchair transfers independently)  Additional Comments: patient is a poor historian, son reported patient able to get up to w/c without assistance at baseline, does ambulate some with cane or FWW  Cognition   Cognition  Overall Cognitive Status: Exceptions (profoundly hard of hearing making it difficult to communicate)  Following Commands:  Follows one step commands with repetition  Attention Span: Attends with cues to redirect  Memory: Decreased short term memory;Decreased recall of biographical Information;Decreased recall of recent events;Decreased recall of precautions  Safety Judgement: Decreased awareness of need for assistance  Problem Solving: Decreased awareness of errors  Insights: Decreased awareness of deficits  Initiation: Requires cues for all  Sequencing: Requires cues for some  Cognition Comment: baseline dementia    Objective          AROM RLE (degrees)  RLE AROM: WFL  RLE General AROM: grossly limited due to pain, PROM appears WFL with mobility  AROM LLE (degrees)  LLE AROM : WFL  Strength RLE  Strength RLE: Exception  Comment: appears grossly limited with mobility due to pain  Strength LLE  Strength LLE: Exception  Comment: appears grossly limited with mobility        Bed mobility  Supine to Sit: 2 Person assistance;Dependent/Total (total assist x 2, head of bed elevated)  Scootin Person assistance;Dependent/Total (max assist x 2 to scoot toward head of bed)  Transfers  Sit to Stand: 2 Person Assistance;Minimal Assistance (min assist x 2 for initial trial from raised bed, min assist x 1 for second trial from raised bed)  Stand to sit: Minimal Assistance (to EOB and to bedside chair, verbal cues for safety)  Bed to Chair: Minimal assistance (to take steps with standard walker)  Ambulation  Ambulation?: Yes  Ambulation 1  Surface: level tile  Device: Standard Walker  Assistance: Minimal assistance  Quality of Gait: assist for walker negotiation, decreased step length/height bilaterally with shuffling steps, gait mildly unsteady though no overt loss of balance noted, forward flexed posture  Distance: 6-7 lateral steps toward head of bed, 5-6 steps from EOB to bedside chair  Stairs/Curb  Stairs?: No     Balance  Sitting - Static: Fair;+ (supervision to sit EOB)  Standing - Static: Fair (CGA with UE support)  Standing - Dynamic: Fair;- (min assist to take steps with standard walker)        Plan   Plan  Times per week: 2-5  Current Treatment Recommendations: Strengthening, Transfer Training, Endurance Training, Patient/Caregiver Education & Training, Balance Training, Gait Training, Functional Mobility Training, Safety Education & Training  Safety Devices  Type of devices: Left in chair, Chair alarm in place, Call light within reach, Nurse notified, Gait belt      OutComes Score                                                  AM-PAC Score  AM-PAC Inpatient Mobility Raw Score : 12 (09/05/21 1522)  AM-PAC Inpatient T-Scale Score : 35.33 (09/05/21 1522)  Mobility Inpatient CMS 0-100% Score: 68.66 (09/05/21 1522)  Mobility Inpatient CMS G-Code Modifier : CL (09/05/21 1522)          Goals  Short term goals  Time Frame for Short term goals: discharge  Short term goal 1: patient will perform bed mobility with moderate assistance  Short term goal 2: patient will perform transfers sit<>stand with CGA  Short term goal 3: patient will ambulate 21' with FWW and CGA  Patient Goals   Patient goals : none stated       Therapy Time   Individual Concurrent Group Co-treatment   Time In 1428         Time Out 1506         Minutes 38         Timed Code Treatment Minutes: 23 Minutes    Timed Code Treatment Minutes:  23 Minutes    Total Treatment Minutes:    23 minutes treatment + 15 minutes evaluation = 38 total treatment minutes    If patient discharges prior to next session this note will serve as a discharge summary. Please see below for the latest assessment towards goals.    Crist[facility] [address]

## 2021-09-05 NOTE — PROGRESS NOTES
The pt is currently sitting up eating his dinner, his son is in the room with him, chair alarm is on and working. Transport should still be here around 1700. IV will come out once Transport gets here. Pt appears comfortable.   Rachel Brandt RN

## 2021-09-05 NOTE — PROGRESS NOTES
Pt's transport time is set for 1700, called report at this time, all questions answered.   Connie Valadez RN

## 2021-09-05 NOTE — DISCHARGE SUMMARY
09/05/2021    CALCIUM 8.8 09/05/2021    PHOS 3.3 05/16/2017         Significant Diagnostic Studies    Radiology:   XR HUMERUS LEFT (MIN 2 VIEWS)   Final Result   Impression:   No acute osseous injury. Glenohumeral osteoarthritis. CT HIP RIGHT WO CONTRAST   Final Result      1. Right total hip arthroplasty with mildly displaced periprosthetic fracture of the greater trochanter. 2.  Paget's disease throughout the pelvis. CT Head WO Contrast   Final Result      1. No skull fracture or evidence of acute intracranial abnormality   2. Stable moderate atrophy and chronic small vessel ischemia      CT CERVICAL SPINE WO CONTRAST   Final Result   Impression:   1. No acute fracture. 2. Degenerative changes cervical spine. XR HIP BILATERAL W AP PELVIS (2 VIEWS)   Final Result   Impression:   1. Acute comminuted and distracted fracture right femoral greater trochanter. 2. Bilateral hip prosthesis. 3. Paget's disease. XR CHEST (2 VW)   Final Result         1. Right middle lobe airspace disease suspicious for pneumonia; correlate for clinical evidence of infection   2. No evidence for acute bone abnormality                    Consults:     IP CONSULT TO ORTHOPEDIC SURGERY  IP CONSULT TO HOSPITALIST    Disposition:  LTC     Condition at Discharge: Stable    Discharge Instructions/Follow-up:  PCP, Orthopedics    Code Status:  Prior     Activity: activity as tolerated    Diet: regular diet      Discharge Medications:     Discharge Medication List as of 9/5/2021  3:00 PM           Details   oxyCODONE (ROXICODONE) 5 MG immediate release tablet Take 1 tablet by mouth every 8 hours as needed for Pain (for severe pain not able to control with tyelenol) for up to 3 days. , Disp-9 tablet, R-0Print              Details   donepezil (ARICEPT) 10 MG tablet Take 10 mg by mouth nightlyHistorical Med      divalproex (DEPAKOTE SPRINKLE) 125 MG capsule Take 2 capsules by mouth every 8 hours, Disp-60 capsule, R-3Print acetaminophen (TYLENOL) 325 MG tablet Take 650 mg by mouth every 6 hours as needed for PainHistorical Med      clopidogrel (PLAVIX) 75 MG tablet Take 75 mg by mouth dailyHistorical Med      aluminum & magnesium hydroxide-simethicone (MAALOX) 200-200-20 MG/5ML SUSP suspension Take 30 mLs by mouth every 4 hours as needed for IndigestionHistorical Med      metoprolol tartrate (LOPRESSOR) 25 MG tablet Take 25 mg by mouth dailyHistorical Med      magnesium hydroxide (MILK OF MAGNESIA) 400 MG/5ML suspension Take 30 mLs by mouth daily as needed for ConstipationHistorical Med      polyethylene glycol (GLYCOLAX) 17 g packet Take 17 g by mouth daily as needed for Constipation Historical Med      traZODone (DESYREL) 50 MG tablet Take 50 mg by mouth nightly as needed for Sleep Historical Med      vitamin D (CHOLECALCIFEROL) 25 MCG (1000 UT) TABS tablet Take 5,000 Units by mouth dailyHistorical Med      atorvastatin (LIPITOR) 40 MG tablet TAKE ONE TABLET BY MOUTH ONCE NIGHTLY, Disp-90 tablet, R-0Normal      pantoprazole (PROTONIX) 40 MG tablet 1 tab po daily, Disp-90 tablet, R-2Normal             Time Spent on discharge is more than 20 minutes in the examination, evaluation, counseling and review of medications and discharge plan. Signed:    Clarita Morrissey MD   9/6/2021      Thank you Kannan Lam MD for the opportunity to be involved in this patient's care. If you have any questions or concerns please feel free to contact me at [phone].

## 2021-09-05 NOTE — PROGRESS NOTES
Per Md the pt may possibly get discharged later today after he is seen my PT/OT, PT/OT is aware and will see and work with him today. I will continue to follow throughout the shift.   Arturo Fitzgerald RN

## 2021-09-05 NOTE — PROGRESS NOTES
Assessment complete, see flow sheet. Pt is alert to self only. Pt yells out whenever he needs to urinate. The pt uses the urinal with assistance. The pt was turned and repositioned but yells in pain if we try to turn him on his right side even if it is very slightly, pain medication was given, see MAR. The pt seems content otherwise. Call light in reach, bed alarm is on and pt on camera for pt safety. I will continue to follow throughout the shift.   Maggi Whiting RN

## 2021-09-05 NOTE — PROGRESS NOTES
PT/OT now working with the pt, the pt's humerus x-ray came back, MD read it and gave verbal that there is no fx. Pt ok to work with PT/OT.   Raul Roberts RN

## 2021-09-05 NOTE — CONSULTS
Norma Boucher MD  [place] Office: [address] [facility], [id number], [facility]  [address] ([id number]) [place]      Inpatient consult to Orthopedic Surgery  Consult performed by: Bibiana Bueno MD  Consult ordered by: Zechariah Vaughn PA-C  Reason for consult: Right  hip fracture  Assessment/Recommendations:   Right hip [employer] periprosthetic fracture  CT reviewed and shows ~7mm displaced. Appropriate for non-operative management with partial 50% weight bearing protected with walker. No active abduction right hip. Mobilize with PT. Also having left arm pain. X ray ordered. Discussed this with his son at the bedside as well. Will need repeat x ray of the right hip in about 1 week to confirm no displacement. If discharging, will need to follow up in my clinic for x rays. [phone] to schedule. Chief Complaint:  Right hip pain    HPI:  80 y.o. male who presents with Right hip fracture after a mechanical GLF. He has dementia and his history is limited. Son supplements history and says he was found down at his nursing facility. He now has severe, acute, sharp, aching, non-radiating Right hip pain, worse with attempted movement, and relieved at rest.     He denies numbness, tingling, fevers, chills, chest pain, shortness of breath or any other acute symptoms    Past Medical History:   Diagnosis Date    Allergic rhinitis     Back pain     CAD (coronary artery disease)     Dementia ([facility] [address]     Hyperlipidemia        Past Surgical History:   Procedure Laterality Date    APPENDECTOMY      CHOLECYSTECTOMY      CORONARY ANGIOPLASTY WITH STENT PLACEMENT  2017    Stent X 1    JOINT REPLACEMENT Bilateral 1994    TONSILLECTOMY         Social History     Tobacco Use    Smoking status: Former Smoker     Quit date: 1964     Years since quittin.4    Smokeless tobacco: Never Used   Substance Use Topics    Alcohol use:  Yes     Alcohol/week: 1.0 standard drinks     Types: 1 Cans of beer per week     Comment: rare    Drug use: No       family history is not on file. Prior to Admission medications    Medication Sig Start Date End Date Taking? Authorizing Provider   donepezil (ARICEPT) 10 MG tablet Take 10 mg by mouth nightly   Yes Historical Provider, MD   divalproex (DEPAKOTE SPRINKLE) 125 MG capsule Take 2 capsules by mouth every 8 hours 3/16/21  Yes Summer Capellan MD   acetaminophen (TYLENOL) 325 MG tablet Take 650 mg by mouth every 6 hours as needed for Pain   Yes Historical Provider, MD   clopidogrel (PLAVIX) 75 MG tablet Take 75 mg by mouth daily   Yes Historical Provider, MD   aluminum & magnesium hydroxide-simethicone (MAALOX) 200-200-20 MG/5ML SUSP suspension Take 30 mLs by mouth every 4 hours as needed for Indigestion   Yes Historical Provider, MD   metoprolol tartrate (LOPRESSOR) 25 MG tablet Take 25 mg by mouth daily   Yes Historical Provider, MD   magnesium hydroxide (MILK OF MAGNESIA) 400 MG/5ML suspension Take 30 mLs by mouth daily as needed for Constipation   Yes Historical Provider, MD   polyethylene glycol (GLYCOLAX) 17 g packet Take 17 g by mouth daily as needed for Constipation    Yes Historical Provider, MD   traZODone (DESYREL) 50 MG tablet Take 50 mg by mouth nightly as needed for Sleep    Yes Historical Provider, MD   vitamin D (CHOLECALCIFEROL) 25 MCG (1000 UT) TABS tablet Take 5,000 Units by mouth daily   Yes Historical Provider, MD   atorvastatin (LIPITOR) 40 MG tablet TAKE ONE TABLET BY MOUTH ONCE NIGHTLY 8/21/20  Yes Rachel Hobson MD   pantoprazole (PROTONIX) 40 MG tablet 1 tab po daily 12/11/19  Yes Rachel Hobson MD       Allergies   Allergen Reactions    Pcn [Penicillins]         Review of Systems   Unable to perform ROS: Dementia       Physical Examination:  BP (!) 153/76   Pulse 67   Temp 97.8 °F (36.6 °C) (Oral)   Resp 16   Ht 6' (1.829 m)   Wt 177 lb (80.3 kg)   SpO2 96%   BMI 24.01 kg/m²     Physical Exam  Vitals reviewed. Constitutional:       Appearance: Normal appearance. He is normal weight. HENT:      Head: Normocephalic. Mouth/Throat:      Mouth: Mucous membranes are moist.      Pharynx: Oropharynx is clear. Cardiovascular:      Rate and Rhythm: Normal rate and regular rhythm. Pulses: Normal pulses. Pulmonary:      Effort: Pulmonary effort is normal. No respiratory distress. Abdominal:      General: Abdomen is flat. Palpations: Abdomen is soft. Tenderness: There is no abdominal tenderness. Musculoskeletal:      Cervical back: Normal range of motion and neck supple. No muscular tenderness. Skin:     General: Skin is warm and dry. Capillary Refill: Capillary refill takes less than 2 seconds. Neurological:      General: No focal deficit present. Mental Status: He is alert and oriented to person, place, and time. Mental status is at baseline. Psychiatric:         Mood and Affect: Mood normal.         Behavior: Behavior normal.         Thought Content: Thought content normal.         Judgment: Judgment normal.           Neck: Full ROM without pain, no tenderness to palpation, no stepoffs, no deformity.  No instability    Chest nontender, no deformity    Abdomen soft, NTND    Pelvis stable    Right upper extremity: Skin intact, no deformity on inspection, full painless range of motion, 5/5 strength throughout, no gross instability, Sensation and motor intact, strong radial pulse    Left upper extremity: Skin intact, no deformity on inspection, full painless range of motion, 5/5 strength throughout, no gross instability, Sensation and motor intact, strong radial pulse    Left  Lower extremity: Skin intact, no deformity on inspection, full painless range of motion, 5/5 strength throughout, no gross instability, Sensation and motor intact, strong dp pulse    Right  Lower extremity: Skin intact, no deformity, tender over the greater trochanter, does not tolerate hip ROM, full range of motion and strength in the ankle and toes, palpable pulse, sensation intacth throuhgout      Imaging:  X ray images of the Right hip and CT scan images were independently reviewed and interpreted by me today and are significant for:  Periprosthetic Greater trochanter fracture ~7mm displaced, with no evidence of femoral component loosening, appears well fixed with cement. Paget's changes noted. Slight eccentric poly wear from this fairly ancient prosthesis    Labs:  Lab Results   Component Value Date/Time    HGB 10.6 (L) 09/05/2021 05:42 AM    CREATININE 0.9 09/05/2021 05:42 AM     (L) 09/05/2021 05:42 AM    INR 1.05 04/20/2018 11:48 AM       MDM:  New acute problem requiring surgical intervention  Surgical discussion  Labs reviewed  Imaging and reports reviewed  Independent history from son J. Delcia Goldmann MD  [facility]  Office: [phone]  Cell: [phone]    09/05/21  11:10 AM

## 2021-09-05 NOTE — DISCHARGE INSTR - COC
Continuity of Care Form    Patient Name: Rola Piedra   :  10/27/1932  MRN:  [id number]    Admit date:  2021  Discharge date:  21    Code Status Order: DNR-CC   Advance Directives:      Admitting Physician:  Titus Merino MD  PCP: Nathan East MD    Discharging Nurse: [facility] Unit/Room#: 5371/3969-59  Discharging Unit Phone Number: [phone]    Emergency Contact:   Extended Emergency Contact Information  Primary Emergency Contact: Leydi Fisher [address] Phone: [phone]  Relation: Child  Secondary Emergency Contact: Stefania [address] Phone: [phone]  Relation: Child    Past Surgical History:  Past Surgical History:   Procedure Laterality Date    APPENDECTOMY      CHOLECYSTECTOMY      CORONARY ANGIOPLASTY WITH STENT PLACEMENT  2017    Stent X 1    JOINT REPLACEMENT Bilateral 1994    TONSILLECTOMY         Immunization History:   Immunization History   Administered Date(s) Administered    Influenza Vaccine, unspecified formulation 10/10/2016       Active Problems:  Patient Active Problem List   Diagnosis Code    Unstable angina ([facility] [address] I20.0    Essential hypertension I10    Abnormal nuclear stress test R94.39    Abnormal stress ECG R94.39    Coronary artery disease involving native coronary artery of native heart with angina pectoris ([address] I25.119    Ischemic heart disease I25.9    Chest pain R07.9    Right upper quadrant abdominal pain R10.11    High cholesterol E78.00    Coronary artery disease I25.10    Seizure ([facility] [address] R56.9    Hip fracture, right, closed, initial encounter ([facility] [address] S72.001A       Isolation/Infection:   Isolation            No Isolation          Patient Infection Status       Infection Onset Added Last Indicated Last Indicated By Review Planned Expiration Resolved Resolved By    COVID-19 Rule Out 21 COVID-19 (Ordered) 21      Resolved    COVID-19 03/14/21 03/15/21 03/14/21 COVID-19   21     COVID-19 Rule Out 21 COVID-19 (Ordered)   03/15/21 Rule-Out Test Resulted            Nurse Assessment:  Last Vital Signs: /69   Pulse 60   Temp 98.3 °F (36.8 °C) (Oral)   Resp 14   Ht 6' (1.829 m)   Wt 177 lb (80.3 kg)   SpO2 94%   BMI 24.01 kg/m²     Last documented pain score (0-10 scale): Pain Level: 0  Last Weight:   Wt Readings from Last 1 Encounters:   21 177 lb (80.3 kg)     Mental Status:  disoriented and alert    IV Access:  - None    Nursing Mobility/ADLs:  Walking   Assisted  Transfer  Assisted  Bathing  Assisted  Dressing  Assisted  Toileting  Assisted  Feeding  Assisted  Med Admin  Assisted  Med Delivery   crushed    Wound Care Documentation and Therapy:        Elimination:  Continence:   · Bowel: Yes  · Bladder: Yes  Urinary Catheter: None   Colostomy/Ileostomy/Ileal Conduit: No       Date of Last BM: ***    Intake/Output Summary (Last 24 hours) at 2021 1232  Last data filed at 2021 1115  Gross per 24 hour   Intake 120 ml   Output 250 ml   Net -130 ml     I/O last 3 completed shifts: In: 120 [P.O.:120]  Out: 200 [Urine:200]    Safety Concerns: At Risk for Falls    Impairments/Disabilities:      [address] [place] [address] Impairments/Disabilities:[id number]:::0}    Nutrition Therapy:  Current Nutrition Therapy:   - Oral Diet:  General    Routes of Feeding: Oral  Liquids: No Restrictions  Daily Fluid Restriction: no  Last Modified Barium Swallow with Video (Video Swallowing Test): not done    Treatments at the Time of Hospital Discharge:   Respiratory Treatments: ***  Oxygen Therapy:  is not on home oxygen therapy.   Ventilator:    - No ventilator support    Rehab Therapies: Physical Therapy and Occupational Therapy  Weight Bearing Status/Restrictions: Partial weight bearing (30-50%) only on leg right leg  Other Medical Equipment (for information only, NOT a DME order):  walker  Other Treatments: ***    Patient's personal belongings (please select all that are sent with patient):  {[facility] DME Belongings:[id number]:::0}    RN SIGNATURE:  Electronically signed by Tony Moralez RN on 9/5/21 at 2:58 PM EDT    CASE MANAGEMENT/SOCIAL WORK SECTION    Inpatient Status Date: ***    Readmission Risk Assessment Score:  Readmission Risk              Risk of Unplanned Readmission:  19           Discharging to Facility/ Agency   · Name:   · Address:  · Phone:  · Fax:    Dialysis Facility (if applicable)   · Name:  · Address:  · Dialysis Schedule:  · Phone:  · Fax:    / signature: {Esignature:[id number]:::0}    PHYSICIAN SECTION    Prognosis: Fair    Condition at Discharge: Stable    Rehab Potential (if transferring to Rehab): Fair    Recommended Labs or Other Treatments After Discharge: PT/OT   partial 50% weight bearing protected with walker. No active abduction right hip. Mobilize with PT. Follow up with orthopedic in 1 week     Physician Certification: I certify the above information and transfer of Rola Piedra  is necessary for the continuing treatment of the diagnosis listed and that he requires [facility] for greater 30 days.      Update Admission H&P: No change in H&P    PHYSICIAN SIGNATURE:  Electronically signed by Titus Merino MD on 9/5/21 at 12:33 PM EDT

## 2021-09-05 NOTE — PROGRESS NOTES
Pt alert and oriented x1, VSS, IV fluids infusing in right arm. Pt is impulsive at times. Camera placed for safety. Weak dorsi/plantar flexion. NWB to right leg. Bed alarm on, bedside table and call light in reach.

## 2021-09-06 ENCOUNTER — APPOINTMENT (OUTPATIENT)
Dept: GENERAL RADIOLOGY | Age: 86
End: 2021-09-06
Payer: MEDICARE

## 2021-09-06 ENCOUNTER — APPOINTMENT (OUTPATIENT)
Dept: CT IMAGING | Age: 86
End: 2021-09-06
Payer: MEDICARE

## 2021-09-06 ENCOUNTER — HOSPITAL ENCOUNTER (EMERGENCY)
Age: 86
Discharge: HOME | End: 2021-09-07
Attending: EMERGENCY MEDICINE
Payer: MEDICARE

## 2021-09-06 VITALS
HEART RATE: 62 BPM | RESPIRATION RATE: 16 BRPM | SYSTOLIC BLOOD PRESSURE: 114 MMHG | OXYGEN SATURATION: 98 % | HEIGHT: 72 IN | DIASTOLIC BLOOD PRESSURE: 53 MMHG | TEMPERATURE: 97.9 F | BODY MASS INDEX: 23.98 KG/M2 | WEIGHT: 177 LBS

## 2021-09-06 DIAGNOSIS — S72.001A CLOSED FRACTURE OF RIGHT HIP, INITIAL ENCOUNTER (HCC): Primary | ICD-10-CM

## 2021-09-06 LAB
ANION GAP SERPL CALCULATED.3IONS-SCNC: 6 MMOL/L (ref 3–16)
BASOPHILS ABSOLUTE: 0 K/UL (ref 0–0.2)
BASOPHILS RELATIVE PERCENT: 0.5 %
BILIRUBIN URINE: NEGATIVE
BLOOD, URINE: NEGATIVE
BUN BLDV-MCNC: 36 MG/DL (ref 7–20)
CALCIUM SERPL-MCNC: 8.8 MG/DL (ref 8.3–10.6)
CHLORIDE BLD-SCNC: 101 MMOL/L (ref 99–110)
CLARITY: CLEAR
CO2: 29 MMOL/L (ref 21–32)
COLOR: YELLOW
CREAT SERPL-MCNC: 1.1 MG/DL (ref 0.8–1.3)
EOSINOPHILS ABSOLUTE: 0.2 K/UL (ref 0–0.6)
EOSINOPHILS RELATIVE PERCENT: 3 %
GFR AFRICAN AMERICAN: >60
GFR NON-AFRICAN AMERICAN: >60
GLUCOSE BLD-MCNC: 102 MG/DL (ref 70–99)
GLUCOSE URINE: NEGATIVE MG/DL
HCT VFR BLD CALC: 31.7 % (ref 40.5–52.5)
HEMOGLOBIN: 10.8 G/DL (ref 13.5–17.5)
KETONES, URINE: NEGATIVE MG/DL
LEUKOCYTE ESTERASE, URINE: NEGATIVE
LYMPHOCYTES ABSOLUTE: 1.7 K/UL (ref 1–5.1)
LYMPHOCYTES RELATIVE PERCENT: 22.1 %
MCH RBC QN AUTO: 33.8 PG (ref 26–34)
MCHC RBC AUTO-ENTMCNC: 34.2 G/DL (ref 31–36)
MCV RBC AUTO: 98.8 FL (ref 80–100)
MICROSCOPIC EXAMINATION: NORMAL
MONOCYTES ABSOLUTE: 0.9 K/UL (ref 0–1.3)
MONOCYTES RELATIVE PERCENT: 12.4 %
NEUTROPHILS ABSOLUTE: 4.7 K/UL (ref 1.7–7.7)
NEUTROPHILS RELATIVE PERCENT: 62 %
NITRITE, URINE: NEGATIVE
PDW BLD-RTO: 13.4 % (ref 12.4–15.4)
PH UA: 6 (ref 5–8)
PLATELET # BLD: 135 K/UL (ref 135–450)
PMV BLD AUTO: 7.5 FL (ref 5–10.5)
POTASSIUM REFLEX MAGNESIUM: 4.1 MMOL/L (ref 3.5–5.1)
PROTEIN UA: NEGATIVE MG/DL
RBC # BLD: 3.21 M/UL (ref 4.2–5.9)
SODIUM BLD-SCNC: 136 MMOL/L (ref 136–145)
SPECIFIC GRAVITY UA: 1.02 (ref 1–1.03)
TROPONIN: 0.02 NG/ML
TROPONIN: 0.02 NG/ML
URINE TYPE: NORMAL
UROBILINOGEN, URINE: 1 E.U./DL
WBC # BLD: 7.5 K/UL (ref 4–11)

## 2021-09-06 PROCEDURE — 81003 URINALYSIS AUTO W/O SCOPE: CPT

## 2021-09-06 PROCEDURE — 85025 COMPLETE CBC W/AUTO DIFF WBC: CPT

## 2021-09-06 PROCEDURE — 99284 EMERGENCY DEPT VISIT MOD MDM: CPT

## 2021-09-06 PROCEDURE — 70450 CT HEAD/BRAIN W/O DYE: CPT

## 2021-09-06 PROCEDURE — 71045 X-RAY EXAM CHEST 1 VIEW: CPT

## 2021-09-06 PROCEDURE — 36415 COLL VENOUS BLD VENIPUNCTURE: CPT

## 2021-09-06 PROCEDURE — 93005 ELECTROCARDIOGRAM TRACING: CPT | Performed by: STUDENT IN AN ORGANIZED HEALTH CARE EDUCATION/TRAINING PROGRAM

## 2021-09-06 PROCEDURE — 73552 X-RAY EXAM OF FEMUR 2/>: CPT

## 2021-09-06 PROCEDURE — 73502 X-RAY EXAM HIP UNI 2-3 VIEWS: CPT

## 2021-09-06 PROCEDURE — 80048 BASIC METABOLIC PNL TOTAL CA: CPT

## 2021-09-06 PROCEDURE — 72125 CT NECK SPINE W/O DYE: CPT

## 2021-09-06 PROCEDURE — 84484 ASSAY OF TROPONIN QUANT: CPT

## 2021-09-07 LAB
EKG ATRIAL RATE: 58 BPM
EKG DIAGNOSIS: NORMAL
EKG P AXIS: 46 DEGREES
EKG P-R INTERVAL: 150 MS
EKG Q-T INTERVAL: 416 MS
EKG QRS DURATION: 86 MS
EKG QTC CALCULATION (BAZETT): 408 MS
EKG R AXIS: 28 DEGREES
EKG T AXIS: 43 DEGREES
EKG VENTRICULAR RATE: 58 BPM

## 2021-09-07 NOTE — ED NOTES
[facility] here to transport patient back to [facility]. IV removed. No complications.         Minus Lamberto, BOBBY  09/07/21 [id number]

## 2021-09-07 NOTE — ED NOTES
Bed: A07-07  Expected date:   Expected time:   Means of arrival:   Comments:  Nataliia Oscar 284, RN  09/06/21 [id number]

## 2021-09-07 NOTE — ED PROVIDER NOTES
[address] [facility] [facility]          EM RESIDENT NOTE       Date of evaluation: 9/6/2021    Chief Complaint     Fall (Unwitnessed, unknown LOC, Dementia -at baseline)      History of Present Illness     Sammye Curling is a 80 y.o. male with a PMHx of hip fracture discharged from the hospital 9/5/2021, dementia with mood disturbance, hyperlipidemia, seizure disorder who is currently at [place] [address] who is presenting to the emergency department today after an unwitnessed fall at the nursing facility. Per nursing facility, they believe patient was likely only down for about 10 minutes. In asking the patient, he denies recollection of the event. He denies any pain at rest.  Specifically, when asked if he is having right hip pain he denies this. He also denies any chest pain or chest tightness. No abdominal pain. No additional trauma. Patient does not know if he lost consciousness or hit his head. Per EMS report, the patient has had stable vital signs. His point-of-care glucose was 106. Other than that mentioned above, there are no other alleviating or provoking factors associated with the patient's presentation today. Review of Systems     Review of Systems    ROS is somewhat limited by dementia  Further review of systems is negative other than that mentioned in the HPI. Past Medical, Surgical, Family, and Social History     He has a past medical history of Allergic rhinitis, Back pain, CAD (coronary artery disease), Dementia ([facility] and Hyperlipidemia. He has a past surgical history that includes joint replacement (Bilateral, 1994); Cholecystectomy; Appendectomy; Tonsillectomy; and Coronary angioplasty with stent (05/2017). His family history is not on file. He reports that he quit smoking about 57 years ago. He has never used smokeless tobacco. He reports current alcohol use of about 1.0 standard drinks of alcohol per week.  He reports that he does not use drugs.    Medications     Previous Medications    ACETAMINOPHEN (TYLENOL) 325 MG TABLET    Take 650 mg by mouth every 6 hours as needed for Pain    ALUMINUM & MAGNESIUM HYDROXIDE-SIMETHICONE (MAALOX) 200-200-20 MG/5ML SUSP SUSPENSION    Take 30 mLs by mouth every 4 hours as needed for Indigestion    ATORVASTATIN (LIPITOR) 40 MG TABLET    TAKE ONE TABLET BY MOUTH ONCE NIGHTLY    CLOPIDOGREL (PLAVIX) 75 MG TABLET    Take 75 mg by mouth daily    DIVALPROEX (DEPAKOTE SPRINKLE) 125 MG CAPSULE    Take 2 capsules by mouth every 8 hours    DONEPEZIL (ARICEPT) 10 MG TABLET    Take 10 mg by mouth nightly    MAGNESIUM HYDROXIDE (MILK OF MAGNESIA) 400 MG/5ML SUSPENSION    Take 30 mLs by mouth daily as needed for Constipation    METOPROLOL TARTRATE (LOPRESSOR) 25 MG TABLET    Take 25 mg by mouth daily    OXYCODONE (ROXICODONE) 5 MG IMMEDIATE RELEASE TABLET    Take 1 tablet by mouth every 8 hours as needed for Pain (for severe pain not able to control with tyelenol) for up to 3 days. PANTOPRAZOLE (PROTONIX) 40 MG TABLET    1 tab po daily    POLYETHYLENE GLYCOL (GLYCOLAX) 17 G PACKET    Take 17 g by mouth daily as needed for Constipation     TRAZODONE (DESYREL) 50 MG TABLET    Take 50 mg by mouth nightly as needed for Sleep     VITAMIN D (CHOLECALCIFEROL) 25 MCG (1000 UT) TABS TABLET    Take 5,000 Units by mouth daily       Allergies     He is allergic to pcn [penicillins]. Physical Exam     INITIAL VITALS: BP: 138/74, Temp: 97.9 °F (36.6 °C), Pulse: 62, Resp: 16, SpO2: 95 %     Gen: NAD, in bed comfortably, non-diaphoretic   Head/Eyes: Atraumatic. PERRL. EOMI bilaterally. No conjunctival injection or scleral icterus   ENT: Neck supple, trachea midline, no LAD. MMM, no posterior oropharyngeal erythema or exudate. External auditory meatus clear without discharge or erythema. No nasal congestion or discharge. Cardiovascular: RRR no murmurs, rubs, or gallops.    Pulmonary:Lungs CTAB, no rales, wheezes, or ronchi   Abdominal: Soft, non-tender. No rebound or guarding. Non-peritoneal   MSK: no obvious long bone deformity. Skin:  Warm, dry. No rashes, ecchymosis or cyanosis. Neuro: Alert and oriented to self and place, appears to be baseline. Cranial nerves grossly intact. Moving all extremities equally. Sensation intact to light touch over the bilateral upper and lower extremities. Extremities:  No peripheral edema. Psych: Euthymic affect. Normal rate and rhythm of speech with full prosody. Linear thought process. DiagnosticResults     EKG   Interpreted in conjunction with emergencydepartment physician Martha Maria MD    EKG with a ventricular rate of 58 bpm.  Sinus bradycardia. WY interval is within normal limits. QRS is narrow. There is no QT or QTc prolongation. There is normal axis. No ST elevation or depression. Unchanged from prior EKG 9/4/2021. RADIOLOGY:  XR HIP 2-3 VW W PELVIS RIGHT   Final Result      Pelvis and right hip:    1. Displaced periprosthetic fracture of the proximal right femur is unchanged in alignment. 2.  Findings of Paget's disease. Right femur: Displaced periprosthetic fracture of the proximal right femur is unchanged in alignment. XR FEMUR RIGHT (MIN 2 VIEWS)   Final Result      Pelvis and right hip:    1. Displaced periprosthetic fracture of the proximal right femur is unchanged in alignment. 2.  Findings of Paget's disease. Right femur: Displaced periprosthetic fracture of the proximal right femur is unchanged in alignment. XR CHEST PORTABLE   Final Result      1. Right greater than left airspace disease disease with slight interval decrease. CT Head WO Contrast   Final Result      Head   1. No evidence of acute intracranial hemorrhage or other interval change. Cervical spine   1. No evidence of acute fracture or malalignment of the cervical spine. CT Cervical Spine WO Contrast   Final Result      Head   1.   No evidence of Range    Troponin 0.02 (H) <0.01 ng/mL   Troponin (lab)   Result Value Ref Range    Troponin 0.02 (H) <0.01 ng/mL       ED BEDSIDE ULTRASOUND:  None    RECENT VITALS:  BP: (!) 114/53, Temp: 97.9 °F (36.6 °C), Pulse: 62,Resp: 16, SpO2: 98 %     Procedures     None    ED Course     Nursing Notes, Past Medical Hx, Past Surgical Hx, Social Hx, Allergies, and Family Hx were reviewed. The patient was given the followingmedications:  No orders of the defined types were placed in this encounter. CONSULTS:  None    ED Course as of Sep 07 0000   Mon Sep 06, 2021   2230 Baseline anemia   CBC Auto Differential(!):    WBC 7.5   RBC 3.21(!)   Hemoglobin Quant 10.8(!)   Hematocrit 31.7(!)   MCV 98.8   MCH 33.8   MCHC 34.2   RDW 13.4   Platelet Count 705   MPV 7.5   Neutrophils % 62.0   Lymphocyte % 22.1   Monocytes % 12.4   Eosinophils % 3.0   Basophils % 0.5   Neutrophils Absolute 4.7   Lymphocytes Absolute 1.7   Monocytes Absolute 0.9   Eosinophils Absolute 0.2   Basophils Absolute 0.0 [JF]   2249 XR FEMUR RIGHT (MIN 2 VIEWS) [JF]   2250 IMPRESSION:     Pelvis and right hip:   1. Displaced periprosthetic fracture of the proximal right femur is unchanged in alignment. 2.  Findings of Paget's disease.     Right femur: Displaced periprosthetic fracture of the proximal right femur is unchanged in alignment. [JF]   2250 IMPRESSION:     1. Right greater than left airspace disease disease with slight interval decrease. XR CHEST PORTABLE [JF]   2250 Head  1. No evidence of acute intracranial hemorrhage or other interval change.     Cervical spine  1. No evidence of acute fracture or malalignment of the cervical spine.    CT Head WO Contrast [JF]      ED Course User Index  [JF] Raegan Russell MD       MEDICAL DECISION MAKING / ASSESSMENT / PLAN     Ashley Florentino is a 80 y.o. [place] a history of present illness, physical examination, past medical history as noted above who presents to the emergency department today after being found down at nursing facility. Of note, the patient was just recently discharged yesterday from the hospital at which time he had nonoperative management of a right hip fracture. On physical examination, the patient has tenderness palpation over the right hip. Patient alert and oriented to his self and appears to be at his mental baseline. He denies pain elsewhere. On physical examination, he is neurovascularly intact. He has full range of motion of the bilateral upper extremities. He is able to lift his left lower extremity off of the table. However, patient has difficulty lifting his right lower extremity secondary to pain. He has positive logroll on the right side as well. Otherwise, physical examination is benign. His lungs are clear to auscultation bilaterally. Patient was complaining of minor chest pain however. EKG here in the emergency department without evidence of ACS. Troponin initially 0.02 with repeat of 0.02. Low suspicion for ACS given history and presentation therefore I do not believe that patient requires further evaluation or work-up for this here in the emergency department. This patient was also evaluated by the attending physician. All care plans werediscussed and agreed upon. Given unknown circumstances of fall, consider possible syncopal episode. Basic metabolic panel was notable for normal electrolytes and no evidence of acute kidney injury. His urinalysis did not show any evidence of infection. His CBC was overall unremarkable with a baseline anemia and no leukocytosis. His chest x-ray did not show any evidence of fracture, pneumothorax, pleural effusion, pneumonia, or other acute abnormality. X-rays were obtained of the right hip in addition to femur which revealed a displaced perio prosthetic fracture of the proximal right femur with unchanged alignment from prior. CT of the head and neck were negative for any acute abnormalities including bleed or fracture.  Given the patient was just recently discharged with fracture, unchanged here, do not believe patient requires orthopedic surgery evaluation at this time. Patient is hemodynamically stable otherwise does not have any other infectious etiology believe would require further cross-sectional imaging or diagnostic work-up. At this time, believe the patient is appropriate for transfer back to his nursing facility. Recommended that the patient follow-up with orthopedic surgery and obtain his repeat imaging as was previously discussed in his recent discharge instructions. I provided the patient with the phone number for orthopedic surgery and recommended that he follow-up in 1 week for further imaging and evaluation. I have also recommended that he follow-up with his primary care physician next 3 days to ensure that his symptoms are improving. At this time, the patient was deemed appropriate for discharge. All laboratory and imaging findings were discussed with the patient, and the patient was given the opportunity to ask questions. All questions were answered to their satisfaction. At this time, the patient was ready to be discharged. Clinical Impression     1.  Closed fracture of right hip, initial encounter [facility])        Disposition     PATIENT REFERRED TO:  Krysten Kuhn MD  [facility] [address]  [phone]    Schedule an appointment as soon as possible for a visit in 3 days      [facility] Emergency Department  [facility] [address]  [phone]  Go to   As needed, If symptoms worsen    Jodi Marsh MD  [address] [place] [address] [address] [facility] Hospital[facility]ella [facility]  [phone]    Schedule an appointment as soon as possible for a visit in 1 week        DISCHARGE MEDICATIONS:  New Prescriptions    No medications on file       DISPOSITION    Discharge     John Mathews MD  09/07/21 0000

## 2021-09-07 NOTE — ED NOTES
Spoke with Diomedes Peters LPN at [facility], she is aware pt is returning.       Ludwig Spear RN  09/07/21 0001

## 2021-09-07 NOTE — ED PROVIDER NOTES
ED Attending Attestation Note     Date of evaluation: 9/6/2021    This patient was seen by the resident. I have seen and examined the patient, agree with the workup, evaluation, management and diagnosis. The care plan has been discussed. I have reviewed the ECG and concur with the resident's interpretation. My assessment reveals an 70-year-old gentleman who presents to the emergency department following an unwitnessed fall. Patient was discharged from [facility] yesterday after a fall with a right hip fracture. Patient had unwitnessed fall today at rehab facility was found down on the ground. The patient is unable to provide any details as to how this occurred. Currently is denying any abdominal pain, nausea or vomiting. Reports mild right-sided chest pain but states he feels as if he was punched. The patient is unreliable historian and changes his story frequently during history taking. He denies any pain in his right hip. He denies any recent fever. On examination I find an elderly adult male, speaking in complete sentences though intermittently confused. This appears to be the patient's baseline based on his history of dementia. He has no increased work of breathing or accessory muscle use during respiration. Patient has bilateral breath sounds on auscultation. His abdomen is soft, nondistended. Neurovascular intact in his lower extremities. We will proceed with laboratory work-up, EKG, chest x-ray, head CT and plain films of his hip. Mahlon Simmonds, MD MPH   Physician    .         Meggan Hannah MD  09/06/21 2121

## 2021-09-07 NOTE — ED NOTES
Pt was discharged 2 days ago after a right hip fracture. Unwitnessed fall, found on floor near bathroom. Unsure how long down, unknown LOC. No obvious head injury. Skin tear to right elbow, bleeding controlled and dressing in place. VSS. Pt has dementia, alert to self and location only. Currently at baseline mental status.      Francheska Garcia RN  09/06/21 2798

## 2021-09-07 NOTE — ED NOTES
Spoke with pts son Trevor Loaiza in r/t father being discharged back to [address] [place] [address] home.       December BOBBY Spear  09/07/21 0003

## 2022-01-31 ENCOUNTER — HOSPITAL ENCOUNTER (EMERGENCY)
Age: 87
Discharge: HOME | End: 2022-02-01
Attending: EMERGENCY MEDICINE
Payer: MEDICARE

## 2022-01-31 ENCOUNTER — APPOINTMENT (OUTPATIENT)
Dept: GENERAL RADIOLOGY | Age: 87
End: 2022-01-31
Payer: MEDICARE
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
## 2022-01-31 PROCEDURE — 73080 X-RAY EXAM OF ELBOW: CPT

## 2022-01-31 PROCEDURE — 99285 EMERGENCY DEPT VISIT HI MDM: CPT

## 2022-01-31 PROCEDURE — 93005 ELECTROCARDIOGRAM TRACING: CPT | Performed by: EMERGENCY MEDICINE

## 2022-01-31 ASSESSMENT — PAIN SCALES - WONG BAKER: WONGBAKER_NUMERICALRESPONSE: 4

## 2022-02-01 ENCOUNTER — APPOINTMENT (OUTPATIENT)
Dept: CT IMAGING | Age: 87
End: 2022-02-01
Payer: MEDICARE

## 2022-02-01 VITALS
RESPIRATION RATE: 16 BRPM | SYSTOLIC BLOOD PRESSURE: 171 MMHG | WEIGHT: 170 LBS | BODY MASS INDEX: 23.03 KG/M2 | TEMPERATURE: 97.9 F | OXYGEN SATURATION: 96 % | HEART RATE: 81 BPM | HEIGHT: 72 IN | DIASTOLIC BLOOD PRESSURE: 84 MMHG

## 2022-02-01 LAB
EKG ATRIAL RATE: 163 BPM
EKG DIAGNOSIS: NORMAL
EKG Q-T INTERVAL: 108 MS
EKG QRS DURATION: 42 MS
EKG QTC CALCULATION (BAZETT): 187 MS
EKG R AXIS: 36 DEGREES
EKG T AXIS: 210 DEGREES
EKG VENTRICULAR RATE: 182 BPM

## 2022-02-01 PROCEDURE — 72125 CT NECK SPINE W/O DYE: CPT

## 2022-02-01 PROCEDURE — 70450 CT HEAD/BRAIN W/O DYE: CPT

## 2022-02-01 NOTE — ED NOTES
Bed: [facility]  Expected date:   Expected time:   Means of arrival:   Comments:  [facility] [address], RN  01/31/22 0699

## 2022-02-01 NOTE — ED NOTES
Pt. Ambulated at bedside with use of ambulatory aid. Tolerated well and provider notified.       Harry Oviedo RN  02/01/22 6994

## 2022-02-01 NOTE — ED NOTES
Attempted to call report back to [facility] center x2. No answer. Will attempt again prior to 0715 pick-up time.       Laine Brunner RN  02/01/22 2016

## 2022-02-01 NOTE — ED PROVIDER NOTES
[facility]  EMERGENCY DEPARTMENT ENCOUNTER          ATTENDING PHYSICIAN NOTE       Date of evaluation: 1/31/2022    Chief Complaint     Fall      History of Present Illness     Cy Steele is a 80 y.o. male who presents chief complaint of fall. This was an unwitnessed fall in nursing facility. Patient does have a history of dementia and I see that he has fallen previously. Thought to be likely mechanical. He complains only of pain in his right elbow. Does not recall the circumstances of the fall. Review of Systems     Review of Systems  Patient complains of elbow pain. Denies headache, chest pain, difficulty breathing, abdominal pain. Otherwise a complete review of systems was negative. Past Medical, Surgical, Family, and Social History         Diagnosis Date    Allergic rhinitis     Back pain     CAD (coronary artery disease)     Dementia ([facility]     Hyperlipidemia          Procedure Laterality Date    APPENDECTOMY      CHOLECYSTECTOMY      CORONARY ANGIOPLASTY WITH STENT PLACEMENT  05/2017    Stent X 1    JOINT REPLACEMENT Bilateral 1994    TONSILLECTOMY       His family history is not on file. He reports that he quit smoking about 57 years ago. He has never used smokeless tobacco. He reports current alcohol use of about 1.0 standard drink of alcohol per week. He reports that he does not use drugs.     Medications     Discharge Medication List as of 2/1/2022  4:08 AM      CONTINUE these medications which have NOT CHANGED    Details   donepezil (ARICEPT) 10 MG tablet Take 10 mg by mouth nightlyHistorical Med      divalproex (DEPAKOTE SPRINKLE) 125 MG capsule Take 2 capsules by mouth every 8 hours, Disp-60 capsule, R-3Print      acetaminophen (TYLENOL) 325 MG tablet Take 650 mg by mouth every 6 hours as needed for PainHistorical Med      clopidogrel (PLAVIX) 75 MG tablet Take 75 mg by mouth dailyHistorical Med      aluminum & magnesium hydroxide-simethicone (MAALOX) 200-200-20 MG/5ML SUSP suspension Take 30 mLs by mouth every 4 hours as needed for IndigestionHistorical Med      metoprolol tartrate (LOPRESSOR) 25 MG tablet Take 25 mg by mouth dailyHistorical Med      magnesium hydroxide (MILK OF MAGNESIA) 400 MG/5ML suspension Take 30 mLs by mouth daily as needed for ConstipationHistorical Med      polyethylene glycol (GLYCOLAX) 17 g packet Take 17 g by mouth daily as needed for Constipation Historical Med      traZODone (DESYREL) 50 MG tablet Take 50 mg by mouth nightly as needed for Sleep Historical Med      vitamin D (CHOLECALCIFEROL) 25 MCG (1000 UT) TABS tablet Take 5,000 Units by mouth dailyHistorical Med      atorvastatin (LIPITOR) 40 MG tablet TAKE ONE TABLET BY MOUTH ONCE NIGHTLY, Disp-90 tablet, R-0Normal      pantoprazole (PROTONIX) 40 MG tablet 1 tab po daily, Disp-90 tablet, R-2Normal             Allergies     He is allergic to pcn [penicillins]. Physical Exam     ED Triage Vitals   Enc Vitals Group      BP 01/31/22 2324 (!) 162/77      Pulse 01/31/22 2323 64      Resp 01/31/22 2323 16      Temp 01/31/22 2323 97.9 °F (36.6 °C)      Temp Source 01/31/22 2323 Oral      SpO2 01/31/22 2326 100 %      Weight 01/31/22 2323 170 lb (77.1 kg)      Height 01/31/22 2323 6' (1.829 m)      Head Circumference --       Peak Flow --       Pain Score --       Pain Loc --       Pain Edu? --       Excl. in [address]? --        General:  Non-toxic, no acute distress, fully cooperative with my exam    HEENT:  NCAT    Neck:  Supple    Pulmonary:   No increased work of breathing; CTAB    Cardiac:  RRR, no murmur, thrill or gallop. Capillary refill <3s. 2+ distal pulses    Abdomen:  Soft, nontender, nondistended; no focal rebound or guarding    Musculoskeletal:  Grossly intact without obvious injury or deformity. Negative logroll bilaterally. Tender to palpation of the R olecranon process with no deformity    Neuro:  Awake and alert. Oriented to person and generally to place (hospital). GCS 15.  No focal motor deficits of the extremities. Able to transfer with assistance (baseline). Ambulated with walker with assistance    Skin: Scattered bruises of various ages but no lacerations      Diagnostic Results       RADIOLOGY:  CT CERVICAL SPINE WO CONTRAST   Final Result      1. No acute traumatic abnormality. CT HEAD WO CONTRAST   Final Result      1. No acute intracranial hemorrhage or mass effect. 2. Moderate cortical atrophy. 3. Moderate white matter disease. XR ELBOW RIGHT (MIN 3 VIEWS)   Final Result      1. No acute abnormality. LABS:   Results for orders placed or performed during the hospital encounter of 01/31/22   EKG 12 Lead   Result Value Ref Range    Ventricular Rate 182 BPM    Atrial Rate 163 BPM    QRS Duration 42 ms    Q-T Interval 108 ms    QTc Calculation (Bazett) 187 ms    R Axis 36 degrees    T Axis 210 degrees    Diagnosis       EKG performed in ER and to be interpreted by ER physician. Confirmed by MD, ER ([id number]),  Deanna Thomason ([phone]) on 2/1/2022 5:22:32 PM       RECENT VITALS:  BP: (!) 171/84, Temp: 97.9 °F (36.6 °C), Pulse: 81, Resp: 16, SpO2: 96 %     Procedures         ED Course     Nursing Notes, Past Medical Hx, Past Surgical Hx, Social Hx, Allergies, and Family Hx were reviewed. The patient was given the following medications:  No orders of the defined types were placed in this encounter. CONSULTS:  None    MEDICAL DECISION MAKING / ASSESSMENT / PLAN     Federico Wiggins is a 80 y.o. male who presents with reported unwitnessed fall at his nursing facility. Does have some right elbow pain but otherwise no evidence of injury. Able to stand at his baseline now which is to transfer with assistance and walk some with a walker. Very low suspicion for hip or pelvic fracture. CT head and C-spine without traumatic abnormality. Plain from the right elbow was unremarkable. Low suspicion for syncope as he has a history of falls previously.   Patient appears to be at his mental status baseline and will be sent back to his nursing facility at this time. Clinical Impression     1.  Fall, initial encounter        Disposition     PATIENT REFERRED TO:  Clarisa De Leon MD  [facility] [phone]  [phone]            DISCHARGE MEDICATIONS:  Discharge Medication List as of 2/1/2022  4:08 AM          DISPOSITION Decision To Discharge 02/01/2022 01:51:29 AM       Ely Umana MD  02/03/22 4224

## 2022-03-05 ENCOUNTER — APPOINTMENT (OUTPATIENT)
Dept: CT IMAGING | Age: 87
DRG: 871 | End: 2022-03-05
Payer: MEDICARE

## 2022-03-05 ENCOUNTER — HOSPITAL ENCOUNTER (INPATIENT)
Age: 87
LOS: 1 days | Discharge: HOSPICE | DRG: 871 | End: 2022-03-07
Attending: EMERGENCY MEDICINE | Admitting: INTERNAL MEDICINE
Payer: MEDICARE

## 2022-03-05 DIAGNOSIS — J18.9 PNEUMONIA OF BOTH LUNGS DUE TO INFECTIOUS ORGANISM, UNSPECIFIED PART OF LUNG: ICD-10-CM

## 2022-03-05 DIAGNOSIS — R65.20 SEPSIS WITH ENCEPHALOPATHY WITHOUT SEPTIC SHOCK, DUE TO UNSPECIFIED ORGANISM (HCC): Primary | ICD-10-CM

## 2022-03-05 DIAGNOSIS — G93.40 SEPSIS WITH ENCEPHALOPATHY WITHOUT SEPTIC SHOCK, DUE TO UNSPECIFIED ORGANISM (HCC): Primary | ICD-10-CM

## 2022-03-05 DIAGNOSIS — A41.9 SEPSIS WITH ENCEPHALOPATHY WITHOUT SEPTIC SHOCK, DUE TO UNSPECIFIED ORGANISM (HCC): Primary | ICD-10-CM

## 2022-03-05 PROCEDURE — 96367 TX/PROPH/DG ADDL SEQ IV INF: CPT

## 2022-03-05 PROCEDURE — 96365 THER/PROPH/DIAG IV INF INIT: CPT

## 2022-03-05 PROCEDURE — 96361 HYDRATE IV INFUSION ADD-ON: CPT

## 2022-03-05 PROCEDURE — 99285 EMERGENCY DEPT VISIT HI MDM: CPT

## 2022-03-05 RX ORDER — ACETAMINOPHEN 650 MG/1
650 SUPPOSITORY RECTAL ONCE
Status: COMPLETED | OUTPATIENT
Start: 2022-03-06 | End: 2022-03-06

## 2022-03-05 NOTE — Clinical Note
Discharge Plan[de-identified] Other/[facility])   Telemetry/Cardiac Monitoring Required?: Yes
27-Nov-2017 18:03

## 2022-03-06 ENCOUNTER — APPOINTMENT (OUTPATIENT)
Dept: CT IMAGING | Age: 87
DRG: 871 | End: 2022-03-06
Payer: MEDICARE

## 2022-03-06 ENCOUNTER — APPOINTMENT (OUTPATIENT)
Dept: GENERAL RADIOLOGY | Age: 87
DRG: 871 | End: 2022-03-06
Payer: MEDICARE

## 2022-03-06 PROBLEM — A41.9 SEPSIS (HCC): Status: ACTIVE | Noted: 2022-03-06

## 2022-03-06 LAB
A/G RATIO: 0.9 (ref 1.1–2.2)
ALBUMIN SERPL-MCNC: 3.7 G/DL (ref 3.4–5)
ALP BLD-CCNC: 561 U/L (ref 40–129)
ALT SERPL-CCNC: 12 U/L (ref 10–40)
ANION GAP SERPL CALCULATED.3IONS-SCNC: 17 MMOL/L (ref 3–16)
AST SERPL-CCNC: 27 U/L (ref 15–37)
BACTERIA: ABNORMAL /HPF
BASE EXCESS VENOUS: 5.2 MMOL/L (ref -2–3)
BASOPHILS ABSOLUTE: 0 K/UL (ref 0–0.2)
BASOPHILS RELATIVE PERCENT: 0.2 %
BILIRUB SERPL-MCNC: 0.3 MG/DL (ref 0–1)
BILIRUBIN URINE: NEGATIVE
BLOOD, URINE: ABNORMAL
BUN BLDV-MCNC: 44 MG/DL (ref 7–20)
CALCIUM SERPL-MCNC: 10.1 MG/DL (ref 8.3–10.6)
CARBOXYHEMOGLOBIN: 1.5 % (ref 0–1.5)
CHLORIDE BLD-SCNC: 96 MMOL/L (ref 99–110)
CLARITY: CLEAR
CO2: 25 MMOL/L (ref 21–32)
COLOR: YELLOW
CREAT SERPL-MCNC: 1.5 MG/DL (ref 0.8–1.3)
EKG ATRIAL RATE: 121 BPM
EKG DIAGNOSIS: NORMAL
EKG P AXIS: 45 DEGREES
EKG P-R INTERVAL: 114 MS
EKG Q-T INTERVAL: 320 MS
EKG QRS DURATION: 82 MS
EKG QTC CALCULATION (BAZETT): 454 MS
EKG R AXIS: 36 DEGREES
EKG T AXIS: 14 DEGREES
EKG VENTRICULAR RATE: 121 BPM
EOSINOPHILS ABSOLUTE: 0.2 K/UL (ref 0–0.6)
EOSINOPHILS RELATIVE PERCENT: 1.8 %
EPITHELIAL CELLS, UA: ABNORMAL /HPF (ref 0–5)
GFR AFRICAN AMERICAN: 53
GFR NON-AFRICAN AMERICAN: 44
GLUCOSE BLD-MCNC: 117 MG/DL (ref 70–99)
GLUCOSE URINE: NEGATIVE MG/DL
HCO3 VENOUS: 31.8 MMOL/L (ref 24–28)
HCT VFR BLD CALC: 36.8 % (ref 40.5–52.5)
HEMOGLOBIN, VEN, REDUCED: 21.9 %
HEMOGLOBIN: 12.2 G/DL (ref 13.5–17.5)
INFLUENZA A: NOT DETECTED
INFLUENZA B: NOT DETECTED
KETONES, URINE: NEGATIVE MG/DL
LACTIC ACID: 3.9 MMOL/L (ref 0.4–2)
LACTIC ACID: 4.7 MMOL/L (ref 0.4–2)
LEUKOCYTE ESTERASE, URINE: NEGATIVE
LIPASE: 14 U/L (ref 13–60)
LYMPHOCYTES ABSOLUTE: 0.7 K/UL (ref 1–5.1)
LYMPHOCYTES RELATIVE PERCENT: 7.6 %
MCH RBC QN AUTO: 32.5 PG (ref 26–34)
MCHC RBC AUTO-ENTMCNC: 33.3 G/DL (ref 31–36)
MCV RBC AUTO: 97.5 FL (ref 80–100)
METHEMOGLOBIN VENOUS: 0.3 % (ref 0–1.5)
MICROSCOPIC EXAMINATION: YES
MONOCYTES ABSOLUTE: 0.1 K/UL (ref 0–1.3)
MONOCYTES RELATIVE PERCENT: 1.6 %
NEUTROPHILS ABSOLUTE: 8 K/UL (ref 1.7–7.7)
NEUTROPHILS RELATIVE PERCENT: 88.8 %
NITRITE, URINE: NEGATIVE
O2 SAT, VEN: 78 %
PCO2, VEN: 54.3 MMHG (ref 41–51)
PDW BLD-RTO: 14.5 % (ref 12.4–15.4)
PH UA: 6 (ref 5–8)
PH VENOUS: 7.38 (ref 7.35–7.45)
PLATELET # BLD: 234 K/UL (ref 135–450)
PMV BLD AUTO: 7.6 FL (ref 5–10.5)
PO2, VEN: 46.3 MMHG (ref 25–40)
POTASSIUM REFLEX MAGNESIUM: 4.8 MMOL/L (ref 3.5–5.1)
PROCALCITONIN: 0.24 NG/ML (ref 0–0.15)
PROTEIN UA: NEGATIVE MG/DL
RBC # BLD: 3.77 M/UL (ref 4.2–5.9)
RBC UA: ABNORMAL /HPF (ref 0–4)
REPORT: NORMAL
SARS-COV-2 RNA, RT PCR: NOT DETECTED
SODIUM BLD-SCNC: 138 MMOL/L (ref 136–145)
SPECIFIC GRAVITY UA: 1.02 (ref 1–1.03)
TCO2 CALC VENOUS: 34 MMOL/L
TOTAL PROTEIN: 7.8 G/DL (ref 6.4–8.2)
TROPONIN: 0.03 NG/ML
URINE REFLEX TO CULTURE: ABNORMAL
URINE TYPE: ABNORMAL
UROBILINOGEN, URINE: 0.2 E.U./DL
WBC # BLD: 9 K/UL (ref 4–11)
WBC UA: ABNORMAL /HPF (ref 0–5)

## 2022-03-06 PROCEDURE — 83690 ASSAY OF LIPASE: CPT

## 2022-03-06 PROCEDURE — 85025 COMPLETE CBC W/AUTO DIFF WBC: CPT

## 2022-03-06 PROCEDURE — 74177 CT ABD & PELVIS W/CONTRAST: CPT

## 2022-03-06 PROCEDURE — 6360000002 HC RX W HCPCS: Performed by: INTERNAL MEDICINE

## 2022-03-06 PROCEDURE — 2580000003 HC RX 258: Performed by: EMERGENCY MEDICINE

## 2022-03-06 PROCEDURE — 82803 BLOOD GASES ANY COMBINATION: CPT

## 2022-03-06 PROCEDURE — 6370000000 HC RX 637 (ALT 250 FOR IP): Performed by: INTERNAL MEDICINE

## 2022-03-06 PROCEDURE — 80053 COMPREHEN METABOLIC PANEL: CPT

## 2022-03-06 PROCEDURE — 81001 URINALYSIS AUTO W/SCOPE: CPT

## 2022-03-06 PROCEDURE — 93005 ELECTROCARDIOGRAM TRACING: CPT | Performed by: STUDENT IN AN ORGANIZED HEALTH CARE EDUCATION/TRAINING PROGRAM

## 2022-03-06 PROCEDURE — 70450 CT HEAD/BRAIN W/O DYE: CPT

## 2022-03-06 PROCEDURE — 2580000003 HC RX 258: Performed by: STUDENT IN AN ORGANIZED HEALTH CARE EDUCATION/TRAINING PROGRAM

## 2022-03-06 PROCEDURE — 2580000003 HC RX 258: Performed by: INTERNAL MEDICINE

## 2022-03-06 PROCEDURE — 1200000000 HC SEMI PRIVATE

## 2022-03-06 PROCEDURE — 6360000004 HC RX CONTRAST MEDICATION: Performed by: EMERGENCY MEDICINE

## 2022-03-06 PROCEDURE — 71046 X-RAY EXAM CHEST 2 VIEWS: CPT

## 2022-03-06 PROCEDURE — 83605 ASSAY OF LACTIC ACID: CPT

## 2022-03-06 PROCEDURE — 6360000002 HC RX W HCPCS: Performed by: STUDENT IN AN ORGANIZED HEALTH CARE EDUCATION/TRAINING PROGRAM

## 2022-03-06 PROCEDURE — 87150 DNA/RNA AMPLIFIED PROBE: CPT

## 2022-03-06 PROCEDURE — 87040 BLOOD CULTURE FOR BACTERIA: CPT

## 2022-03-06 PROCEDURE — 36415 COLL VENOUS BLD VENIPUNCTURE: CPT

## 2022-03-06 PROCEDURE — 87636 SARSCOV2 & INF A&B AMP PRB: CPT

## 2022-03-06 PROCEDURE — 6370000000 HC RX 637 (ALT 250 FOR IP): Performed by: STUDENT IN AN ORGANIZED HEALTH CARE EDUCATION/TRAINING PROGRAM

## 2022-03-06 PROCEDURE — 84145 PROCALCITONIN (PCT): CPT

## 2022-03-06 PROCEDURE — 87077 CULTURE AEROBIC IDENTIFY: CPT

## 2022-03-06 PROCEDURE — 84484 ASSAY OF TROPONIN QUANT: CPT

## 2022-03-06 RX ORDER — SODIUM CHLORIDE 0.9 % (FLUSH) 0.9 %
5-40 SYRINGE (ML) INJECTION EVERY 12 HOURS SCHEDULED
Status: DISCONTINUED | OUTPATIENT
Start: 2022-03-06 | End: 2022-03-07 | Stop reason: SDUPTHER

## 2022-03-06 RX ORDER — SODIUM CHLORIDE, SODIUM LACTATE, POTASSIUM CHLORIDE, CALCIUM CHLORIDE 600; 310; 30; 20 MG/100ML; MG/100ML; MG/100ML; MG/100ML
INJECTION, SOLUTION INTRAVENOUS CONTINUOUS
Status: ACTIVE | OUTPATIENT
Start: 2022-03-06 | End: 2022-03-06

## 2022-03-06 RX ORDER — SODIUM CHLORIDE 9 MG/ML
25 INJECTION, SOLUTION INTRAVENOUS PRN
Status: DISCONTINUED | OUTPATIENT
Start: 2022-03-06 | End: 2022-03-07 | Stop reason: HOSPADM

## 2022-03-06 RX ORDER — POLYETHYLENE GLYCOL 3350 17 G/17G
17 POWDER, FOR SOLUTION ORAL DAILY PRN
Status: DISCONTINUED | OUTPATIENT
Start: 2022-03-06 | End: 2022-03-07 | Stop reason: HOSPADM

## 2022-03-06 RX ORDER — DONEPEZIL HYDROCHLORIDE 10 MG/1
10 TABLET, FILM COATED ORAL NIGHTLY
Status: DISCONTINUED | OUTPATIENT
Start: 2022-03-06 | End: 2022-03-07 | Stop reason: HOSPADM

## 2022-03-06 RX ORDER — ACETAMINOPHEN 325 MG/1
650 TABLET ORAL EVERY 6 HOURS PRN
Status: DISCONTINUED | OUTPATIENT
Start: 2022-03-06 | End: 2022-03-07 | Stop reason: HOSPADM

## 2022-03-06 RX ORDER — LANOLIN ALCOHOL/MO/W.PET/CERES
5 CREAM (GRAM) TOPICAL NIGHTLY
COMMUNITY

## 2022-03-06 RX ORDER — POTASSIUM CHLORIDE 750 MG/1
20 CAPSULE, EXTENDED RELEASE ORAL DAILY
Status: ON HOLD | COMMUNITY
End: 2022-03-07 | Stop reason: HOSPADM

## 2022-03-06 RX ORDER — OXYCODONE HYDROCHLORIDE 5 MG/1
5 TABLET ORAL EVERY 8 HOURS
Status: ON HOLD | COMMUNITY
End: 2022-03-07 | Stop reason: HOSPADM

## 2022-03-06 RX ORDER — SODIUM CHLORIDE, SODIUM LACTATE, POTASSIUM CHLORIDE, AND CALCIUM CHLORIDE .6; .31; .03; .02 G/100ML; G/100ML; G/100ML; G/100ML
500 INJECTION, SOLUTION INTRAVENOUS ONCE
Status: COMPLETED | OUTPATIENT
Start: 2022-03-06 | End: 2022-03-06

## 2022-03-06 RX ORDER — SODIUM CHLORIDE, SODIUM LACTATE, POTASSIUM CHLORIDE, AND CALCIUM CHLORIDE .6; .31; .03; .02 G/100ML; G/100ML; G/100ML; G/100ML
1000 INJECTION, SOLUTION INTRAVENOUS ONCE
Status: COMPLETED | OUTPATIENT
Start: 2022-03-06 | End: 2022-03-06

## 2022-03-06 RX ORDER — DIVALPROEX SODIUM 125 MG/1
250 CAPSULE, COATED PELLETS ORAL EVERY 8 HOURS SCHEDULED
Status: DISCONTINUED | OUTPATIENT
Start: 2022-03-06 | End: 2022-03-07 | Stop reason: HOSPADM

## 2022-03-06 RX ORDER — TAMSULOSIN HYDROCHLORIDE 0.4 MG/1
0.4 CAPSULE ORAL NIGHTLY
COMMUNITY

## 2022-03-06 RX ORDER — SODIUM CHLORIDE 0.9 % (FLUSH) 0.9 %
5-40 SYRINGE (ML) INJECTION PRN
Status: DISCONTINUED | OUTPATIENT
Start: 2022-03-06 | End: 2022-03-07 | Stop reason: HOSPADM

## 2022-03-06 RX ORDER — DIVALPROEX SODIUM 500 MG/1
500 TABLET, EXTENDED RELEASE ORAL NIGHTLY
Status: ON HOLD | COMMUNITY
End: 2022-03-07

## 2022-03-06 RX ORDER — ONDANSETRON 4 MG/1
4 TABLET, ORALLY DISINTEGRATING ORAL EVERY 8 HOURS PRN
Status: DISCONTINUED | OUTPATIENT
Start: 2022-03-06 | End: 2022-03-07 | Stop reason: HOSPADM

## 2022-03-06 RX ORDER — METHOCARBAMOL 500 MG/1
500 TABLET, FILM COATED ORAL EVERY 6 HOURS PRN
COMMUNITY

## 2022-03-06 RX ORDER — MORPHINE SULFATE 2 MG/ML
1 INJECTION, SOLUTION INTRAMUSCULAR; INTRAVENOUS ONCE
Status: COMPLETED | OUTPATIENT
Start: 2022-03-06 | End: 2022-03-06

## 2022-03-06 RX ORDER — SODIUM CHLORIDE 0.9 % (FLUSH) 0.9 %
5-40 SYRINGE (ML) INJECTION EVERY 12 HOURS SCHEDULED
Status: DISCONTINUED | OUTPATIENT
Start: 2022-03-06 | End: 2022-03-07 | Stop reason: HOSPADM

## 2022-03-06 RX ORDER — BUTALBITAL, ACETAMINOPHEN AND CAFFEINE 50; 325; 40 MG/1; MG/1; MG/1
1 TABLET ORAL EVERY 12 HOURS PRN
COMMUNITY

## 2022-03-06 RX ORDER — ACETAMINOPHEN 650 MG/1
650 SUPPOSITORY RECTAL EVERY 6 HOURS PRN
Status: DISCONTINUED | OUTPATIENT
Start: 2022-03-06 | End: 2022-03-07 | Stop reason: HOSPADM

## 2022-03-06 RX ORDER — ONDANSETRON 2 MG/ML
4 INJECTION INTRAMUSCULAR; INTRAVENOUS EVERY 6 HOURS PRN
Status: DISCONTINUED | OUTPATIENT
Start: 2022-03-06 | End: 2022-03-07 | Stop reason: HOSPADM

## 2022-03-06 RX ORDER — FUROSEMIDE 40 MG/1
40 TABLET ORAL DAILY
Status: ON HOLD | COMMUNITY
End: 2022-03-07 | Stop reason: HOSPADM

## 2022-03-06 RX ADMIN — VANCOMYCIN HYDROCHLORIDE 1500 MG: 10 INJECTION, POWDER, LYOPHILIZED, FOR SOLUTION INTRAVENOUS at 01:52

## 2022-03-06 RX ADMIN — SODIUM CHLORIDE, POTASSIUM CHLORIDE, SODIUM LACTATE AND CALCIUM CHLORIDE 1000 ML: 600; 310; 30; 20 INJECTION, SOLUTION INTRAVENOUS at 01:16

## 2022-03-06 RX ADMIN — SODIUM CHLORIDE, POTASSIUM CHLORIDE, SODIUM LACTATE AND CALCIUM CHLORIDE 500 ML: 600; 310; 30; 20 INJECTION, SOLUTION INTRAVENOUS at 03:57

## 2022-03-06 RX ADMIN — SODIUM CHLORIDE, POTASSIUM CHLORIDE, SODIUM LACTATE AND CALCIUM CHLORIDE: 600; 310; 30; 20 INJECTION, SOLUTION INTRAVENOUS at 07:06

## 2022-03-06 RX ADMIN — ENOXAPARIN SODIUM 40 MG: 100 INJECTION SUBCUTANEOUS at 09:44

## 2022-03-06 RX ADMIN — CEFEPIME HYDROCHLORIDE 2000 MG: 2 INJECTION, POWDER, FOR SOLUTION INTRAVENOUS at 01:17

## 2022-03-06 RX ADMIN — MORPHINE SULFATE 1 MG: 2 INJECTION, SOLUTION INTRAMUSCULAR; INTRAVENOUS at 18:01

## 2022-03-06 RX ADMIN — CEFEPIME HYDROCHLORIDE 2000 MG: 2 INJECTION, POWDER, FOR SOLUTION INTRAVENOUS at 13:03

## 2022-03-06 RX ADMIN — ACETAMINOPHEN 650 MG: 650 SUPPOSITORY RECTAL at 01:21

## 2022-03-06 RX ADMIN — ACETAMINOPHEN 650 MG: 650 SUPPOSITORY RECTAL at 07:07

## 2022-03-06 RX ADMIN — IOPAMIDOL 80 ML: 755 INJECTION, SOLUTION INTRAVENOUS at 02:36

## 2022-03-06 RX ADMIN — ACETAMINOPHEN 650 MG: 650 SUPPOSITORY RECTAL at 23:50

## 2022-03-06 RX ADMIN — ACETAMINOPHEN 650 MG: 650 SUPPOSITORY RECTAL at 16:07

## 2022-03-06 ASSESSMENT — PAIN SCALES - PAIN ASSESSMENT IN ADVANCED DEMENTIA (PAINAD)
TOTALSCORE: 0
FACIALEXPRESSION: 0
FACIALEXPRESSION: 1
CONSOLABILITY: 0
BREATHING: 0
BREATHING: 0
CONSOLABILITY: 0
BODYLANGUAGE: 0
NEGVOCALIZATION: 0
TOTALSCORE: 0
NEGVOCALIZATION: 2
NEGVOCALIZATION: 0
BODYLANGUAGE: 0
TOTALSCORE: 5
CONSOLABILITY: 0
BREATHING: 0
BODYLANGUAGE: 0
TOTALSCORE: 0
CONSOLABILITY: 0
CONSOLABILITY: 1
FACIALEXPRESSION: 0
BODYLANGUAGE: 1
FACIALEXPRESSION: 0
TOTALSCORE: 0
BREATHING: 0
NEGVOCALIZATION: 0
BODYLANGUAGE: 0
FACIALEXPRESSION: 0
BREATHING: 0
NEGVOCALIZATION: 0

## 2022-03-06 ASSESSMENT — PAIN SCALES - GENERAL
PAINLEVEL_OUTOF10: 0

## 2022-03-06 NOTE — PLAN OF CARE
Problem: Falls - Risk of:  Goal: Will remain free from falls  Description: Will remain free from falls  Note: He is a fall risk. Armband in place, door open, and bed alarm on. Camera in room. Family at bedside. Problem: Skin Integrity:  Goal: Absence of new skin breakdown  Description: Absence of new skin breakdown  Note: He has bruises and abrasions all over. Largest bruises to left elbow, and right hip. Skin tear to left elbow is bleeding. Non adherent dressing and DSD applied. Heels and coccyx red. Prevalon boots on. Special mattress ordered. Rash noted to his back. Left great toe is bloodied, (Stubbed toe?). Repositioned on his side. Problem: Gas Exchange - Impaired:  Intervention: Assess signs and symptoms of impaired gas exchange  Note: Respirations shallow and unlabored. Pulse ox 90-92% on room air. No cough. Will monitor. Problem: Hyperthermia:  Intervention: Assess symptoms of hyperthermia  3/6/2022 1521 by Constantino Serrano RN  Note: Temperature max this shift was 99.4. He is on IV antibiotics. 3/6/2022 1521 by Constantino Serrano RN  Note: Temp max 99.

## 2022-03-06 NOTE — PLAN OF CARE
80 y.o. male who presents from his memory care unit for AMS for the past 2 days. # Sepsis ( fever of 104.3 degrees F, Tachycardia) - differential includes HAP/ aspiration PNA as seen on CT A/P , meningitis, VS other.  Influenza screen, Covid and UA is negative  Pt's son who is the POA does not want any heroic measures including LP, intubation, CPR, shock)  # LEYDI with azotemia  # Mild troponin emia  # Paget's Ds per imaging studies  # Acute L2 fracture  # Mid SBO vs Ileus ( No NGT per son)  # Right renal mass suspicious for RCC  # Advanced dementia    -comfort care  Hospice consulted

## 2022-03-06 NOTE — CARE COORDINATION
Case Management Assessment           Initial Evaluation                Date / Time of Evaluation: 3/6/2022 12:43 PM                 Assessment Completed by: Guido Villagran RN     Met with Mr. Diana José son, Danyelle Wesley, at the bedside to complete initial assessment. The pt is a long term care resident at [facility]. Dr. Will Mosqueda has put an order in to consult hospice. Discussed the Medicare Hospice benefit (philosophy, team approach, levels of care, medications, and Medicare coverage). Provided a list of hospice companies in [place]. David Stark requested a referral to [facility] [phone].          Patient Name: Elodia Triana     YOB: 1932  Diagnosis: Sepsis ([facility] [address] [A41.9]  Pneumonia of both lungs due to infectious organism, unspecified part of lung [J18.9]  Sepsis with encephalopathy without septic shock, due to unspecified organism ([address] [A41.9, R65.20, G93.40]     Date / Time: 3/5/2022 11:31 PM    Patient Admission Status: Inpatient    Current PCP: Jennifer Seo MD    Chart Reviewed: Yes  Patient/ Family Interviewed: Yes    Initial assessment completed at bedside with: Pt's son, David Stark    Emergency Contacts:  Extended Emergency Contact Information  Primary Emergency Contact: [address] of [address] [place] [address] Phone: [phone]  Relation: Child  Secondary Emergency Contact: Elizabet Larson Principal [facility] of [address] [place] [address] Phone: [phone]  Relation: Child    Advance Directives:   Code Status: DNR-CCA    Financial  Payor: Anum Mir / Plan: Nilesh Resendez / Product Type: *No Product type* /     [address] [place] [address] [address], [address] [facility]  [address] [address]  Phone: [phone] Fax: [phone]    ADLS Dependent/max assist  for all ADL's  Support Systems: [address] Environment: Pt is from [address] [place] [address] to RETURN to current housing: Yes    DISCHARGE PLAN:  Disposition: Plan to return to [facility] with hospice services    Transportation PLAN for discharge: EMS transportation     The Patient and/or patient representative Taz Avery and his family were provided with a choice of provider and agrees with the discharge plan Yes    Freedom of choice list was provided with basic dialogue that supports the patient's individualized plan of care/goals and shares the quality data associated with the providers.  Yes      Owen Rivers RN  [facility]  Case Management Department

## 2022-03-06 NOTE — PROGRESS NOTES
Hospitalist Progress Note      PCP: Joanna Darling MD    Date of Admission: 3/5/2022    Chief Complaint: [facility], [facility] Course: ***     Subjective: ***       Medications:  Reviewed    Infusion Medications    sodium chloride      sodium chloride      lactated ringers 100 mL/hr at 03/06/22 0706     Scheduled Medications    divalproex  250 mg Oral 3 times per day    donepezil  10 mg Oral Nightly    sodium chloride flush  5-40 mL IntraVENous 2 times per day    enoxaparin  40 mg SubCUTAneous Daily    sodium chloride flush  5-40 mL IntraVENous 2 times per day    cefepime  2,000 mg IntraVENous Q12H    vancomycin (VANCOCIN) intermittent dosing (placeholder)   Other See Admin Instructions     PRN Meds: sodium chloride flush, sodium chloride, ondansetron **OR** ondansetron, polyethylene glycol, acetaminophen **OR** acetaminophen, sodium chloride flush, sodium chloride      Intake/Output Summary (Last 24 hours) at 3/6/2022 1208  Last data filed at 3/6/2022 0428  Gross per 24 hour   Intake 1732.75 ml   Output    Net 1732.75 ml       Physical Exam Performed:    BP (!) 79/45   Pulse 84   Temp 99.4 °F (37.4 °C) (Axillary)   Resp 18   Wt 152 lb 12.5 oz (69.3 kg)   SpO2 (!) 89%   BMI 20.72 kg/m²     General appearance: No apparent distress, appears stated age and cooperative. HEENT: Pupils equal, round, and reactive to light. Conjunctivae/corneas clear. Neck: Supple, with full range of motion. No jugular venous distention. Trachea midline. Respiratory:  Normal respiratory effort. Clear to auscultation, bilaterally without Rales/Wheezes/Rhonchi. Cardiovascular: Regular rate and rhythm with normal S1/S2 without murmurs, rubs or gallops. Abdomen: Soft, non-tender, non-distended with normal bowel sounds. Musculoskeletal: No clubbing, cyanosis or edema bilaterally. Full range of motion without deformity. Skin: Skin color, texture, turgor normal.  No rashes or lesions.   Neurologic:  Neurovascularly intact without any focal sensory/motor deficits. Cranial nerves: II-XII intact, grossly non-focal.  Psychiatric: Alert and oriented, thought content appropriate, normal insight  Capillary Refill: Brisk,3 seconds, normal   Peripheral Pulses: +2 palpable, equal bilaterally       Labs:   Recent Labs     03/06/22 0053   WBC 9.0   HGB 12.2*   HCT 36.8*        Recent Labs     03/06/22 0053      K 4.8   CL 96*   CO2 25   BUN 44*   CREATININE 1.5*   CALCIUM 10.1     Recent Labs     03/06/22 0053   AST 27   ALT 12   BILITOT 0.3   ALKPHOS 561*     No results for input(s): INR in the last 72 hours. Recent Labs     03/06/22 0053   TROPONINI 0.03*       Urinalysis:      Lab Results   Component Value Date    NITRU Negative 03/06/2022    WBCUA 0-2 03/06/2022    BACTERIA Rare 03/06/2022    RBCUA 0-2 03/06/2022    BLOODU MODERATE 03/06/2022    SPECGRAV 1.020 03/06/2022    GLUCOSEU Negative 03/06/2022       Radiology:  CT ABDOMEN PELVIS W IV CONTRAST Additional Contrast? None   Final Result   1. Scattered infiltrates in the right middle lobe and both lower    lobes, left greater than right could represent pneumonia,    atelectasis, or scarring. 2.  Scattered gas fluid levels in nondilated proximal small bowel. The distal small bowel is decompressed. Consider ileus versus    low-grade mid small bowel obstruction. 3.  There is a woven bone appearance throughout the lower lumbar    spine at L4 and L5 as well as throughout the sacrum and pelvis    consistent with Paget's disease. No acute fracture is seen. 4.  There is a 4.1 cm mass in the lower pole the right kidney    suspicious for renal cell carcinoma. 5.  There is an acute appearing 20% superior endplate compression    fracture of L2. No posterior protrusion of fracture fragments is    seen. XR CHEST (2 VW)   Final Result   Probable mild emphysema and possible trace left pleural effusion. No acute infiltrates are identified.

## 2022-03-06 NOTE — PROGRESS NOTES
4 Eyes Admission Assessment     I agree as the admission nurse that 2 RN's have performed a thorough Head to Toe Skin Assessment on the patient. ALL assessment sites listed below have been assessed on admission. Areas assessed by both nurses:   [x]   Head, Face, and Ears   [x]   Shoulders, Back, and Chest  [x]   Arms, Elbows, and Hands   [x]   Coccyx, Sacrum, and Ischium  [x]   Legs, Feet, and Heels        Does the Patient have Skin Breakdown?   Yes a wound was noted on the Admission Assessment and an LDA was Initiated documentation include the Esperanza-wound, Wound Assessment, Measurements, Dressing Treatment, Drainage, and Color\", Heels red and soft, L great toe with bloody  Nail, coccyx red, and tear to right elbow (LDA) and and abrasion to the left elbow        Arik Prevention initiated:  yes   Wound Care Orders initiated:  No      C nurse consulted for Pressure Injury (Stage 3,4, Unstageable, DTI, NWPT, and Complex wounds) or Arik score 18 or lower:  No      Nurse 1 eSignature: Electronically signed by Andrey Quezada RN on 3/6/22 at 8:09 AM EST    **SHARE this note so that the co-signing nurse is able to place an eSignature**    Nurse 2 eSignature: Electronically signed by Sherry Maldonado RN on 3/6/22 at 9:39 AM EST Health Maintenance Due   Topic Date Due   • Depression Screening  01/18/2019   • Influenza Vaccine (1) 09/01/2019       Left message to call office back   Patient is over due for physical with Dr Powell   Please schedule physical with Dr. Powell when patient calls back

## 2022-03-06 NOTE — CARE COORDINATION
[facility] admission nurse will be at the hospital around 1530p to meet with Mr. Diana rosario.     Electronically signed by Hoda Amanda RN on 3/6/2022 at 3:23 PM  [phone]

## 2022-03-06 NOTE — ED PROVIDER NOTES
ED Attending Attestation Note     Date of evaluation: 3/5/2022    This patient was seen by the resident. I have seen and examined the patient, agree with the workup, evaluation, management and diagnosis. The care plan has been discussed. I have reviewed the ECG and concur with the resident's interpretation. I was present for any procedures performed in the resident's  note and have made edits to the note where appropriate. My assessment reveals 80 y.o. male with history of severe dementia living in a memory care unit presenting to the emergency department today for fever and altered mental status. He is unable to provide any history and is nonverbal and quite toxic appearing. He is febrile, tachycardic, and tachypneic. Abdomen is soft, unclear if tender. No significant lower extremity edema. No rashes. His son is at bedside who states that he is officially [facility] per paperwork that has been filled out. Unfortunately, we do not have access to this paperwork, but the son will be able to make his medical decisions as he is the power of . After thorough discussion of treatment options, the son would prefer to proceed with fairly conservative treatment of likely infectious etiology is okay with IV fluids and IV antibiotics. He is adamantly opposed to any significant invasive or painful procedures. As such we will not place any invasive lines, initiate pressors, intubate, or administer CPR. His labs are notable for mild LEYDI, elevated lactic acid, mildly elevated troponin. Given his tenuous respiratory status and DNI status, will not administer a full 30 mL/KG bolus should he become further hypoxic and dyspneic from this as we do not have any prior data regarding his heart function and ejection fraction. He was given broad-spectrum antibiotics and antipyretics. His initial infectious work-up is not particularly revealing, as such a CT of the abdomen pelvis was obtained.   He does have some basilar infiltrates that are concerning for pneumonia. He also has some bowel changes concerning for ileus versus small bowel obstruction. However, based on the his medical decision makers wishes, we will not pursue any aggressive care related to this or the incidental renal cell carcinoma. As such she will be treated conservatively with ongoing fluids and antibiotics. I discussed his case with the hospitalist who is accepted for further care and management. Critical Care:  Due to the immediate potential for life-threatening deterioration due to sepsis, I spent 36 minutes providing critical care. This time excludes time spent performing procedures but includes time spent on direct patient care, history retrieval, review of the chart, and discussions with patient, family, and consultant(s).          Thomas Flanagan MD  03/06/22 0482

## 2022-03-06 NOTE — PROGRESS NOTES
Pt arrived from ED to room 6314 with elevated axillary temp of 102.2. Will give a dose of Tylenol once verified by Rx. Pt is lethargic. Responds to painful stimuli only. Pt able to say, \"Help me,\" but does not answer questions or follow commands. Telemetry applied. Pt is tachycardic with . Repositioned pt for comfort and to ease breathing. Safety precautions put in place; side rails up x 3, bed alarm activated, door open. Will monitor.

## 2022-03-06 NOTE — H&P
Hospital Medicine History & Physical      PCP: Darlyn Jones MD    Date of Admission: 3/5/2022    Date of Service: Pt seen/examined on 3/6/2022 and Admitted to Inpatient with expected LOS greater than two midnights due to medical therapy. Chief Complaint:  Fever, lethargy      History Of Present Illness:      80 y.o. male who presents from his memory care unit for AMS for the past 2 days. Son is at the bed side and gives some history. Patient has advanced Alzheimer's dementia. Usually awake, alert, conversive but confused. But over the past 2 days he has been more confused from his baseline, lethargic and non conversive. Pt was febrile and tachycardic upon arrival in ED, but not requiring any supplemental Oxygen. No obvious signs and symptoms of respiratory infection other that CT findings. Son refused LP to rule out meningitis. Influenza screen and COVID PCR is negative. Patient's son, who is his POA is comfortable with current treatment for now, does not want any heroic measures or additional work up at this time. Past Medical History:          Diagnosis Date    Allergic rhinitis     Back pain     CAD (coronary artery disease)     Dementia ([facility] [address]     Hyperlipidemia        Past Surgical History:          Procedure Laterality Date    APPENDECTOMY      CHOLECYSTECTOMY      CORONARY ANGIOPLASTY WITH STENT PLACEMENT  05/2017    Stent X 1    JOINT REPLACEMENT Bilateral 1994    TONSILLECTOMY         Medications Prior to Admission:      Prior to Admission medications    Medication Sig Start Date End Date Taking?  Authorizing Provider   donepezil (ARICEPT) 10 MG tablet Take 10 mg by mouth nightly    Historical Provider, MD   divalproex (DEPAKOTE SPRINKLE) 125 MG capsule Take 2 capsules by mouth every 8 hours 3/16/21   Rodríguez Salgado MD   acetaminophen (TYLENOL) 325 MG tablet Take 650 mg by mouth every 6 hours as needed for Pain    Historical Provider, MD   clopidogrel (PLAVIX) 75 MG tablet Take 75 mg by mouth daily    Historical Provider, MD   aluminum & magnesium hydroxide-simethicone (MAALOX) 200-200-20 MG/5ML SUSP suspension Take 30 mLs by mouth every 4 hours as needed for Indigestion    Historical Provider, MD   metoprolol tartrate (LOPRESSOR) 25 MG tablet Take 25 mg by mouth daily    Historical Provider, MD   magnesium hydroxide (MILK OF MAGNESIA) 400 MG/5ML suspension Take 30 mLs by mouth daily as needed for Constipation    Historical Provider, MD   polyethylene glycol (GLYCOLAX) 17 g packet Take 17 g by mouth daily as needed for Constipation     Historical Provider, MD   traZODone (DESYREL) 50 MG tablet Take 50 mg by mouth nightly as needed for Sleep     Historical Provider, MD   vitamin D (CHOLECALCIFEROL) 25 MCG (1000 UT) TABS tablet Take 5,000 Units by mouth daily    Historical Provider, MD   atorvastatin (LIPITOR) 40 MG tablet TAKE ONE TABLET BY MOUTH ONCE NIGHTLY 8/21/20   Mandy Jiang MD   pantoprazole (PROTONIX) 40 MG tablet 1 tab po daily 12/11/19   Mandy Jiang MD       Allergies:  Pcn [penicillins]    Social History:      TOBACCO:   reports that he quit smoking about 57 years ago. He has never used smokeless tobacco.  ETOH:   reports current alcohol use of about 1.0 standard drink of alcohol per week. E-Cigarettes/Vaping Use     Questions Responses    E-Cigarette/Vaping Use     Start Date     Passive Exposure     Quit Date     Counseling Given     Comments             Family History:    Reviewed in detail and negative for DM, CAD, Cancer, CVA. REVIEW OF SYSTEMS COMPLETED:     Patient has advanced dementia, unable to obtain ROS    PHYSICAL EXAM PERFORMED:    BP (!) 101/53   Pulse 104   Temp 104.3 °F (40.2 °C) (Rectal)   Resp 25   Wt 153 lb 8 oz (69.6 kg)   SpO2 92%   BMI 20.82 kg/m²     General appearance:  Lethargic, non verbal, chronically ill-appearing  HEENT:  Normal cephalic, atraumatic without obvious deformity. Pupils equal, round, and reactive to light. lumbar    spine at L4 and L5 as well as throughout the sacrum and pelvis    consistent with Paget's disease. No acute fracture is seen. 4.  There is a 4.1 cm mass in the lower pole the right kidney    suspicious for renal cell carcinoma. 5.  There is an acute appearing 20% superior endplate compression    fracture of L2. No posterior protrusion of fracture fragments is    seen. XR CHEST (2 VW)   Final Result   Probable mild emphysema and possible trace left pleural effusion. No acute infiltrates are identified. CT Head WO Contrast   Final Result     Mild diffuse cerebral atrophy and mild to moderate    periventricular white matter low density consistent with chronic    small vessel disease and/or senescent changes. There is no    evidence of acute large vessel infarct or intracranial hemorrhage. ASSESSMENT:    Active Hospital Problems    Diagnosis Date Noted    Sepsis [facility]) [A41.9] 03/06/2022   # Sepsis ( fever of 104.3 degrees F, Tachycardia) - differential includes HAP/ aspiration PNA as seen on CT A/P , meningitis, VS other.  Influenza screen, Covid and UA is negative  Pt's son who is the POA does not want any heroic measures including LP, intubation, CPR, shock)  # LEYDI with azotemia  # Mild troponin emia  # Paget's Ds per imaging studies  # Acute L2 fracture  # Mid SBO vs Ileus ( No NGT per son)  # Right renal mass suspicious for RCC  # Advanced dementia    PLAN:  - Continue current broad spectrum ABX  - Pharmacy consult for vancomycin dosing  - IV hydration  - Comfort care  - O2 for comfort  - Bronchodilator therapy as needed  - No work up was initiated for right renal mass as well as L2 fracture  - Will minimize lab draws  - SS consult  - Palliative and hospice care consults      DVT Prophylaxis: SCDs  Diet: Diet NPO  Code Status: DNR-CCA    PT/OT Eval Status: Bed rest    Dispo - GMF with telemetry       Allen Clement MD    Thank you Ramses Mcdonald Lili Fung MD for the opportunity to be involved in this patient's care. If you have any questions or concerns please feel free to contact me at [phone].

## 2022-03-06 NOTE — CONSULTS
Clinical Pharmacy Progress Note    Vancomycin - Management by Pharmacy    Consult Date(s): 3/6  Consulting Provider(s): Dr. Jairo Irby / Plan  HAP - Vancomycin   Concurrent Antimicrobials: cefepime- day #1   Day of Vanc Therapy: #1   Current Dosing Method: Intermittent Dosing by Levels   Therapeutic Goal: ~ 15 mg/L   Current Dose / Frequency: based on levels for LEYDI    Plan / Rationale:   - Patient received a 1500mg (20mg/kg) LD at 0100 this morning  - Patient with slight LEYDI, Scr 1.5 this AM and baseline appears to be 0.9-1.1 so will dose based on levels until Scr improves  - No additional dose today and level ordered for tomorrow AM to assess need for further dosing    Will continue to monitor clinical condition and make adjustments to regimen as appropriate. Thanks for consulting pharmacy! Lianne Has PharmD  Pharmacy Resident   Please call with questions [phone]  3/6/2022 7:03 AM      Interval update: Presented to ED for AMS x2 days. Subjective/Objective: Mr. Liza Tovar is a 80 y.o. male with a PMHx significant for back pain, CAD, dementia, and HLD, admitted for altered mental status and lethargy. Pharmacy has been consulted to dose vancomycin. Height:   Ht Readings from Last 1 Encounters:   01/31/22 6' (1.829 m)     Weight:   Wt Readings from Last 1 Encounters:   03/06/22 152 lb 12.5 oz (69.3 kg)       Current & Prior Antimicrobial Regimen(s):   PTD Vancomycin  o 1500mg IV x1 3/6    Cefepime EI 3/6-current  o 2000mg IV LD x1  o 2000mg IV q12h      Level(s) / Doses:  Date Time Dose Level / Type of Level Interpretation   3/7 0600 1000mg IV x1 LD Random = ordered           Note: Serum levels collected for AUC-based dosing may be high if collected in close proximity to the dose administered. This is not necessarily indicative of toxicity.     Cultures & Sensitivities:  Date Site Micro Susceptibility / Result   3/6 Blood x2 Active     3/6 MRSA PCR Ordered      Labs / Ancillary Data:    Estimated Creatinine Clearance: 33 mL/min (A) (based on SCr of 1.5 mg/dL (H)). Recent Labs     03/06/22  0053   CREATININE 1.5*   BUN 44*   WBC 9.0       Additional Lab Values / Findings of Note:    Procalcitonin: No results for input(s): PROCAL in the last 72 hours.

## 2022-03-06 NOTE — ED PROVIDER NOTES
[address] [facility] RESIDENT NOTE       Date of evaluation: 3/5/2022    Chief Complaint     Altered Mental Status      History of Present Illness     Luca White is a 80 y.o. male with history of dementia, CAD, recent fall with vertebral fracture who presents altered mental status. Per EMS, patient from SNF and has been moaning with nonsensical speech for the last 2 days. Per patient's son, patient is normally able to communicate and express needs. No known possible infectious source per history from son and EMS. History limited due to patient condition and altered mental status. Review of Systems     Review of Systems   Unable to perform ROS: Mental status change       Past Medical, Surgical, Family, and Social History     He has a past medical history of Allergic rhinitis, Back pain, CAD (coronary artery disease), Dementia ([address] and Hyperlipidemia. He has a past surgical history that includes joint replacement (Bilateral, 1994); Cholecystectomy; Appendectomy; Tonsillectomy; and Coronary angioplasty with stent (05/2017). His family history is not on file. He reports that he quit smoking about 57 years ago. He has never used smokeless tobacco. He reports current alcohol use of about 1.0 standard drink of alcohol per week. He reports that he does not use drugs.     Medications     Current Discharge Medication List      CONTINUE these medications which have NOT CHANGED    Details   butalbital-acetaminophen-caffeine (FIORICET, ESGIC) -40 MG per tablet Take 1 tablet by mouth every 12 hours as needed for Headaches      divalproex (DEPAKOTE ER) 500 MG extended release tablet Take 500 mg by mouth nightly      furosemide (LASIX) 40 MG tablet Take 40 mg by mouth 2 times daily      melatonin 3 MG TABS tablet Take 5 mg by mouth nightly as needed      methocarbamol (ROBAXIN) 500 MG tablet Take 500 mg by mouth every 6 hours as needed      oxyCODONE 5 MG capsule Take 5 mg by mouth every 8 hours as needed for Pain.      potassium chloride (MICRO-K) 10 MEQ extended release capsule Take 20 mEq by mouth daily      tamsulosin (FLOMAX) 0.4 MG capsule Take 0.4 mg by mouth nightly      donepezil (ARICEPT) 10 MG tablet Take 10 mg by mouth nightly      divalproex (DEPAKOTE SPRINKLE) 125 MG capsule Take 2 capsules by mouth every 8 hours  Qty: 60 capsule, Refills: 3      acetaminophen (TYLENOL) 325 MG tablet Take 650 mg by mouth every 8 hours as needed for Pain       clopidogrel (PLAVIX) 75 MG tablet Take 75 mg by mouth daily      aluminum & magnesium hydroxide-simethicone (MAALOX) 200-200-20 MG/5ML SUSP suspension Take 30 mLs by mouth every 4 hours as needed for Indigestion      metoprolol tartrate (LOPRESSOR) 25 MG tablet Take 25 mg by mouth daily      magnesium hydroxide (MILK OF MAGNESIA) 400 MG/5ML suspension Take 30 mLs by mouth daily as needed for Constipation      polyethylene glycol (GLYCOLAX) 17 g packet Take 17 g by mouth daily as needed for Constipation       vitamin D (CHOLECALCIFEROL) 25 MCG (1000 UT) TABS tablet Take 5,000 Units by mouth daily      atorvastatin (LIPITOR) 40 MG tablet TAKE ONE TABLET BY MOUTH ONCE NIGHTLY  Qty: 90 tablet, Refills: 0      pantoprazole (PROTONIX) 40 MG tablet 1 tab po daily  Qty: 90 tablet, Refills: 2             Allergies     He is allergic to pcn [penicillins]. Physical Exam     INITIAL VITALS: BP: 138/67, Temp: 100.8 °F (38.2 °C), Pulse: 109, Resp: 20, SpO2: 91 %   Physical Exam  Vitals and nursing note reviewed. Constitutional:       General: He is not in acute distress. Appearance: Normal appearance. He is well-developed. He is ill-appearing. He is not toxic-appearing. HENT:      Head: Normocephalic and atraumatic. Right Ear: External ear normal.      Left Ear: External ear normal.      Nose: Nose normal. No congestion or rhinorrhea.       Mouth/Throat:      Mouth: Mucous membranes are moist.      Pharynx: Oropharynx is clear. No oropharyngeal exudate or posterior oropharyngeal erythema. Eyes:      General: No scleral icterus. Right eye: No discharge. Left eye: No discharge. Conjunctiva/sclera: Conjunctivae normal.      Pupils: Pupils are equal, round, and reactive to light. Cardiovascular:      Rate and Rhythm: Regular rhythm. Tachycardia present. Pulses: Normal pulses. Heart sounds: Normal heart sounds. No murmur heard. No friction rub. No gallop. Pulmonary:      Effort: Pulmonary effort is normal. No respiratory distress. Breath sounds: Normal breath sounds. No wheezing, rhonchi or rales. Abdominal:      General: Abdomen is flat. There is no distension. Palpations: Abdomen is soft. Tenderness: There is no abdominal tenderness. There is no guarding or rebound. Musculoskeletal:         General: No swelling. Cervical back: Normal range of motion and neck supple. Right lower leg: No edema. Left lower leg: No edema. Skin:     General: Skin is warm and dry. Neurological:      General: No focal deficit present. Comments: Somnolent, unable to answer questions and does not follow commands, appears to move extremities equally although very little movement. Psychiatric:      Comments: Mood and affect appropriate for situation         DiagnosticResults     EKG   Interpreted in conjunction with emergencydepartment physician No att. providers found  Rhythm: sinus tachycardia  Rate: 120-130  Axis: normal  Ectopy: premature ventricular contractions (infrequent)  Conduction: normal  ST Segments: No obvious acute changes, poor quality study  T Waves:no acute change  Q Waves: none  Clinical Impression: no acute changes  Comparison: When compared to previous EKG 9/6/2021, sinus tachycardia is now present    RADIOLOGY:  CT ABDOMEN PELVIS W IV CONTRAST Additional Contrast? None   Final Result   1.   Scattered infiltrates in the right middle lobe and both lower    lobes, left greater than right could represent pneumonia,    atelectasis, or scarring. 2.  Scattered gas fluid levels in nondilated proximal small bowel. The distal small bowel is decompressed. Consider ileus versus    low-grade mid small bowel obstruction. 3.  There is a woven bone appearance throughout the lower lumbar    spine at L4 and L5 as well as throughout the sacrum and pelvis    consistent with Paget's disease. No acute fracture is seen. 4.  There is a 4.1 cm mass in the lower pole the right kidney    suspicious for renal cell carcinoma. 5.  There is an acute appearing 20% superior endplate compression    fracture of L2. No posterior protrusion of fracture fragments is    seen. XR CHEST (2 VW)   Final Result   Probable mild emphysema and possible trace left pleural effusion. No acute infiltrates are identified. CT Head WO Contrast   Final Result     Mild diffuse cerebral atrophy and mild to moderate    periventricular white matter low density consistent with chronic    small vessel disease and/or senescent changes. There is no    evidence of acute large vessel infarct or intracranial hemorrhage.               LABS:   Results for orders placed or performed during the hospital encounter of 03/05/22   COVID-19 & Influenza Combo    Specimen: Nasopharyngeal Swab   Result Value Ref Range    SARS-CoV-2 RNA, RT PCR NOT DETECTED NOT DETECTED    INFLUENZA A NOT DETECTED NOT DETECTED    INFLUENZA B NOT DETECTED NOT DETECTED   CBC with Auto Differential   Result Value Ref Range    WBC 9.0 4.0 - 11.0 K/uL    RBC 3.77 (L) 4.20 - 5.90 M/uL    Hemoglobin 12.2 (L) 13.5 - 17.5 g/dL    Hematocrit 36.8 (L) 40.5 - 52.5 %    MCV 97.5 80.0 - 100.0 fL    MCH 32.5 26.0 - 34.0 pg    MCHC 33.3 31.0 - 36.0 g/dL    RDW 14.5 12.4 - 15.4 %    Platelets 254 567 - 213 K/uL    MPV 7.6 5.0 - 10.5 fL    Neutrophils % 88.8 %    Lymphocytes % 7.6 %    Monocytes % 1.6 %    Eosinophils % 1.8 % Basophils % 0.2 %    Neutrophils Absolute 8.0 (H) 1.7 - 7.7 K/uL    Lymphocytes Absolute 0.7 (L) 1.0 - 5.1 K/uL    Monocytes Absolute 0.1 0.0 - 1.3 K/uL    Eosinophils Absolute 0.2 0.0 - 0.6 K/uL    Basophils Absolute 0.0 0.0 - 0.2 K/uL   Comprehensive Metabolic Panel w/ Reflex to MG   Result Value Ref Range    Sodium 138 136 - 145 mmol/L    Potassium reflex Magnesium 4.8 3.5 - 5.1 mmol/L    Chloride 96 (L) 99 - 110 mmol/L    CO2 25 21 - 32 mmol/L    Anion Gap 17 (H) 3 - 16    Glucose 117 (H) 70 - 99 mg/dL    BUN 44 (H) 7 - 20 mg/dL    CREATININE 1.5 (H) 0.8 - 1.3 mg/dL    GFR Non- 44 (A) >60    GFR  53 (A) >60    Calcium 10.1 8.3 - 10.6 mg/dL    Total Protein 7.8 6.4 - 8.2 g/dL    Albumin 3.7 3.4 - 5.0 g/dL    Albumin/Globulin Ratio 0.9 (L) 1.1 - 2.2    Total Bilirubin 0.3 0.0 - 1.0 mg/dL    Alkaline Phosphatase 561 (H) 40 - 129 U/L    ALT 12 10 - 40 U/L    AST 27 15 - 37 U/L   Lipase   Result Value Ref Range    Lipase 14.0 13.0 - 60.0 U/L   Urinalysis with Reflex to Culture    Specimen: Urine   Result Value Ref Range    Color, UA Yellow Straw/Yellow    Clarity, UA Clear Clear    Glucose, Ur Negative Negative mg/dL    Bilirubin Urine Negative Negative    Ketones, Urine Negative Negative mg/dL    Specific Gravity, UA 1.020 1.005 - 1.030    Blood, Urine MODERATE (A) Negative    pH, UA 6.0 5.0 - 8.0    Protein, UA Negative Negative mg/dL    Urobilinogen, Urine 0.2 <2.0 E.U./dL    Nitrite, Urine Negative Negative    Leukocyte Esterase, Urine Negative Negative    Microscopic Examination YES     Urine Type Voided     Urine Reflex to Culture Not Indicated    Lactic Acid   Result Value Ref Range    Lactic Acid 3.9 (H) 0.4 - 2.0 mmol/L   Blood Gas, Venous   Result Value Ref Range    pH, Bhupinder 7.376 7.350 - 7.450    pCO2, Bhupinder 54.3 (H) 41.0 - 51.0 mmHg    pO2, Bhupinder 46.3 (H) 25.0 - 40.0 mmHg    HCO3, Venous 31.8 (H) 24.0 - 28.0 mmol/L    Base Excess, Bhupinder 5.2 (H) -2.0 - 3.0 mmol/L    O2 Sat, Bhupinder 78 Not established %    Carboxyhemoglobin 1.5 0.0 - 1.5 %    MetHgb, Bhupinder 0.3 0.0 - 1.5 %    TC02 (Calc), Bhupinder 34 mmol/L    Hemoglobin, Bhupinder, Reduced 21.90 %   Troponin   Result Value Ref Range    Troponin 0.03 (H) <0.01 ng/mL   Microscopic Urinalysis   Result Value Ref Range    WBC, UA 0-2 0 - 5 /HPF    RBC, UA 0-2 0 - 4 /HPF    Epithelial Cells, UA 0-1 0 - 5 /HPF    Bacteria, UA Rare (A) None Seen /HPF   Lactic Acid   Result Value Ref Range    Lactic Acid 4.7 (HH) 0.4 - 2.0 mmol/L   Procalcitonin   Result Value Ref Range    Procalcitonin 0.24 (H) 0.00 - 0.15 ng/mL   EKG 12 Lead   Result Value Ref Range    Ventricular Rate 121 BPM    Atrial Rate 121 BPM    P-R Interval 114 ms    QRS Duration 82 ms    Q-T Interval 320 ms    QTc Calculation (Bazett) 454 ms    P Axis 45 degrees    R Axis 36 degrees    T Axis 14 degrees    Diagnosis       EKG performed in ER and to be interpreted by ER physician. Confirmed by MD, ER ([id number]),  Felix Araujo ([phone]) on 3/6/2022 7:14:07 AM           RECENT VITALS:  BP: (!) 92/55, Temp: 99.4 °F (37.4 °C), Pulse: 84,Resp: 18, SpO2: 91 %     Procedures     None    ED Course     Nursing Notes, Past Medical Hx, Past Surgical Hx, Social Hx, Allergies, and Family Hx were reviewed. The patient was given the followingmedications:  Orders Placed This Encounter   Medications    acetaminophen (TYLENOL) suppository 650 mg    lactated ringers bolus    cefepime (MAXIPIME) 2000 mg IVPB minibag     Order Specific Question:   Antimicrobial Indications     Answer: Other     Order Specific Question:   Other Abx Indication     Answer: Suspected Sepsis of Skin or Soft Tissue Origin    vancomycin (VANCOCIN) 1,500 mg in dextrose 5 % 250 mL IVPB     Order Specific Question:   Antimicrobial Indications     Answer: Other     Order Specific Question:   Other Abx Indication     Answer:    Suspected Sepsis of Skin or Soft Tissue Origin    iopamidol (ISOVUE-370) 76 % injection 80 mL    lactated ringers bolus CONSULTS:  IP CONSULT TO HOSPITALIST      MEDICAL DECISION MAKING / ASSESSMENT / Reji  is a 80 y.o. male with history of CAD, dementia, recent falls who presents with altered mental status. Patient is ill-appearing on initial presentation, tachycardic, febrile with axillary temp 100.8, O2 saturations in the low 90s, tachypneic, and very somnolent. Patient appears septic at this time. Will give 1 L LR, start broad-spectrum antibiotics, obtain blood cultures, urine, urine culture, CXR, CT head, CBC, VBG, lactate, CMP. EKG is nonischemic. No signs of skin wounds. Initial lactate 3.9. Not acidotic on VBG. CMP with LEYDI and elevated alk phos. Initial troponin is 0.03.  UA pending at this time. CXR with possible trace left pleural effusion. Heart rate slightly improved in the 100s after LR. Rectal temp 104. Rectal Tylenol given. Patient son, who is POA, at bedside. Discussed goals of care with patient's son. Stated if this was a bacterial infection that could be treated with antibiotics, would like to move forward with treatment. Would not like any invasive measures such as intubation, CPR, central line placement, use of vasoactive agents. Patient remains somnolent. UA not consistent with infection. Unclear source of infection at this time. Will get CT abdomen pelvis with IV contrast.  Possible this is meningitis. Patient is being treated for this with cefepime and vancomycin. Discussed this with son, will forego LP at this time. Will await CT abdomen pelvis with IV contrast and admit to medicine. CT abdomen pelvis with IV contrast shows possible grade SBO versus ileus. This would not necessarily explain patient's symptoms. Did show possible pneumonia in lower lung fields on CT abdomen pelvis. Possible aspiration pneumonia. Discussed patient with hospitalist.  Patient is admitted to medicine. This patient was also evaluated by the attending physician.  All care

## 2022-03-07 VITALS
BODY MASS INDEX: 20.72 KG/M2 | HEART RATE: 98 BPM | OXYGEN SATURATION: 92 % | RESPIRATION RATE: 18 BRPM | TEMPERATURE: 100.8 F | WEIGHT: 152.78 LBS | SYSTOLIC BLOOD PRESSURE: 107 MMHG | DIASTOLIC BLOOD PRESSURE: 65 MMHG

## 2022-03-07 PROCEDURE — 6370000000 HC RX 637 (ALT 250 FOR IP): Performed by: INTERNAL MEDICINE

## 2022-03-07 PROCEDURE — 2580000003 HC RX 258: Performed by: INTERNAL MEDICINE

## 2022-03-07 PROCEDURE — 6360000002 HC RX W HCPCS: Performed by: INTERNAL MEDICINE

## 2022-03-07 RX ORDER — DIVALPROEX SODIUM 500 MG/1
500 TABLET, DELAYED RELEASE ORAL NIGHTLY
COMMUNITY

## 2022-03-07 RX ORDER — GLYCOPYRROLATE 0.2 MG/ML
0.2 INJECTION INTRAMUSCULAR; INTRAVENOUS EVERY 4 HOURS PRN
Status: DISCONTINUED | OUTPATIENT
Start: 2022-03-07 | End: 2022-03-07 | Stop reason: HOSPADM

## 2022-03-07 RX ORDER — MAGNESIUM HYDROXIDE/ALUMINUM HYDROXICE/SIMETHICONE 120; 1200; 1200 MG/30ML; MG/30ML; MG/30ML
30 SUSPENSION ORAL EVERY 4 HOURS PRN
Status: ON HOLD | COMMUNITY
End: 2022-03-07 | Stop reason: HOSPADM

## 2022-03-07 RX ORDER — DIVALPROEX SODIUM 250 MG/1
250 TABLET, DELAYED RELEASE ORAL 2 TIMES DAILY
COMMUNITY

## 2022-03-07 RX ORDER — ACETAMINOPHEN 325 MG/1
650 TABLET ORAL 2 TIMES DAILY
COMMUNITY

## 2022-03-07 RX ORDER — LORAZEPAM 2 MG/ML
1 CONCENTRATE ORAL
Status: DISCONTINUED | OUTPATIENT
Start: 2022-03-07 | End: 2022-03-07 | Stop reason: HOSPADM

## 2022-03-07 RX ORDER — MORPHINE SULFATE 20 MG/ML
5 SOLUTION ORAL
Status: DISCONTINUED | OUTPATIENT
Start: 2022-03-07 | End: 2022-03-07 | Stop reason: HOSPADM

## 2022-03-07 RX ADMIN — CEFEPIME HYDROCHLORIDE 2000 MG: 2 INJECTION, POWDER, FOR SOLUTION INTRAVENOUS at 00:16

## 2022-03-07 RX ADMIN — ENOXAPARIN SODIUM 40 MG: 100 INJECTION SUBCUTANEOUS at 07:48

## 2022-03-07 RX ADMIN — Medication 10 ML: at 07:49

## 2022-03-07 RX ADMIN — ACETAMINOPHEN 650 MG: 650 SUPPOSITORY RECTAL at 07:48

## 2022-03-07 ASSESSMENT — PAIN SCALES - PAIN ASSESSMENT IN ADVANCED DEMENTIA (PAINAD)
FACIALEXPRESSION: 0
BREATHING: 0
BREATHING: 0
BODYLANGUAGE: 0
BODYLANGUAGE: 0
TOTALSCORE: 0
FACIALEXPRESSION: 0
CONSOLABILITY: 0
CONSOLABILITY: 0
FACIALEXPRESSION: 0
NEGVOCALIZATION: 1
NEGVOCALIZATION: 0
BREATHING: 0
BODYLANGUAGE: 0
CONSOLABILITY: 0
FACIALEXPRESSION: 0
BODYLANGUAGE: 0
TOTALSCORE: 0
NEGVOCALIZATION: 0
FACIALEXPRESSION: 0
BREATHING: 0
CONSOLABILITY: 0
CONSOLABILITY: 0
BODYLANGUAGE: 0
TOTALSCORE: 0
TOTALSCORE: 1
NEGVOCALIZATION: 0
BREATHING: 0
BODYLANGUAGE: 0
CONSOLABILITY: 0
BODYLANGUAGE: 0
TOTALSCORE: 0
NEGVOCALIZATION: 0
BODYLANGUAGE: 0
FACIALEXPRESSION: 0
TOTALSCORE: 0
TOTALSCORE: 0
BREATHING: 0
TOTALSCORE: 0
FACIALEXPRESSION: 0
CONSOLABILITY: 0
NEGVOCALIZATION: 0
BREATHING: 0
CONSOLABILITY: 0
FACIALEXPRESSION: 0
NEGVOCALIZATION: 0
NEGVOCALIZATION: 0
BREATHING: 0

## 2022-03-07 NOTE — CONSULTS
Clinical Pharmacy Progress Note    Vancomycin has been discontinued. Will sign off pharmacy to dose Vancomycin consult. If medication is restarted and pharmacy is to manage dosing, please re-consult at that time.     Please call with questions--  Thanks--  Cathy Worthington, PharmD, [id number], [address] ([facility])   3/7/2022 10:21 AM

## 2022-03-07 NOTE — CARE COORDINATION
CM spoke with [facility] :  She  States  She will follow up with the [employer] and  [employer]  And  call CM back with an Update . Solo Mott Jr[address] [facility] [address], [address] [facility] [address]       Phone: [phone]       Fax: [phone]            Electronically signed by Huber Baxter RN on 3/7/2022 at 10:42 AM       ++++++++++++++++++++++++++++++++++++++++++++++++          CM following for  D/C planning :  CM called  To  See if  Meeting was completed yesterday, and outcome :      CM spoke with Rema:   Who states  Anni Holly  Met with the family and  Pt is appropriate  For  Hospice  Services  And they are  waiting to see when pt is medically ready to D/C :  CM informed them he is  Ready and  Waiting to see if  Pt  Will return [address]:  She is  Having [address] [place] [address] liaison,   Call CM  To discuss, consents  Have  Not been signed  Yet. [facility] [address], [address] [facility] [address]       Phone: [phone]       Fax: [phone]      awaiting Call back     CM met with Hola Brown and Dale Moreira at bedside: They confirmed  They did sign  Consents and  Wish for pt to go to Facility or to [facility]. Patient from ;  [address] are  Requesting  A facility closer to the  On [facility] side of [facility] near [place]. Electronically signed by Huber Baxter RN on 3/7/2022 at 10:12 AM         Huber Baxter RN Case Manager  [facility]  [address] [place] [address].   [place] [address]  [phone]  Fax [phone]

## 2022-03-07 NOTE — PROGRESS NOTES
Clinical Pharmacy Progress Note  Medication History      List of of current medications patient is taking is complete. Home Medication list in EPIC updated to reflect changes noted below.      Source of information:  Medication list from [facility] [address] made to home medication list:   Medications removed (no longer taking):  · None     Medications added:   · Acetaminophen BID scheduled  · Maalox prn     Medication doses / instructions adjusted:   · Depakote - changed ER tabs to DR tabs  · Furosemide - takes daily, not BID  · Melatonin  · Oxycodone - takes q8h scheduled (not prn)       Please call with questions--  Thanks--  Jan Mustafa, PharmD, BCPS, BCGP  [id number] ([facility])   3/7/2022 9:26 AM

## 2022-03-07 NOTE — DISCHARGE SUMMARY
Hospital Medicine Discharge Summary    Patient ID: Astrid May      Patient's PCP: Lorenzo Parham MD    Admit Date: 3/5/2022     Discharge Date:   03/07/22    Admitting Physician: Alessandra Pompa MD     Discharge Physician: Liza Ramon MD     Discharge Diagnoses: Active Hospital Problems    Diagnosis Date Noted    Sepsis [facility]) [A41.9] 03/06/2022       The patient was seen and examined on day of discharge and this discharge summary is in conjunction with any daily progress note from day of discharge. Hospital Course:   Patient was admitted for sepsis, LEYDI and acute metabolic encephalopathy likely secondary to group B Streptococcus bacteremia. Patient was placed on IV antibiotics and IV fluids. Son/POA did not want any heroic measures including LP, intubation, CPR and shock. Patient had underlying history of Paget's disease, renal mass and advanced dementia. Palliative care and hospice was consulted. Both sons Lizet Arcos and Linda Orourke) met with hospice and signed the paperwork. They both believe that patient does not have good quality of life and would not want to proceed with his life like this. Comfort care orders were placed and patient was made DNR CC. Patient is being discharged to long-term care with hospice. Physical Exam Performed:     /65   Pulse 98   Temp 100.8 °F (38.2 °C) (Axillary)   Resp 18   Wt 152 lb 12.5 oz (69.3 kg)   SpO2 92%   BMI 20.72 kg/m²       General appearance: Chronically ill-appearing  Respiratory:  Normal respiratory effort. Clear to auscultation, bilaterally without Rales/Wheezes/Rhonchi. Cardiovascular:  Regular rate and rhythm with normal S1/S2 without murmurs, rubs or gallops. Abdomen: Soft, non-tender, non-distended with normal bowel sounds. Neurologic: Minimally responsive, does not follow commands    Labs:  For convenience and continuity at follow-up the following most recent labs are provided:      CBC:    Lab Results   Component Value Date    WBC 9.0 03/06/2022    HGB 12.2 03/06/2022    HCT 36.8 03/06/2022     03/06/2022       Renal:    Lab Results   Component Value Date     03/06/2022    K 4.8 03/06/2022    CL 96 03/06/2022    CO2 25 03/06/2022    BUN 44 03/06/2022    CREATININE 1.5 03/06/2022    CALCIUM 10.1 03/06/2022    PHOS 3.3 05/16/2017         Significant Diagnostic Studies    Radiology:   CT ABDOMEN PELVIS W IV CONTRAST Additional Contrast? None   Final Result   1. Scattered infiltrates in the right middle lobe and both lower    lobes, left greater than right could represent pneumonia,    atelectasis, or scarring. 2.  Scattered gas fluid levels in nondilated proximal small bowel. The distal small bowel is decompressed. Consider ileus versus    low-grade mid small bowel obstruction. 3.  There is a woven bone appearance throughout the lower lumbar    spine at L4 and L5 as well as throughout the sacrum and pelvis    consistent with Paget's disease. No acute fracture is seen. 4.  There is a 4.1 cm mass in the lower pole the right kidney    suspicious for renal cell carcinoma. 5.  There is an acute appearing 20% superior endplate compression    fracture of L2. No posterior protrusion of fracture fragments is    seen. XR CHEST (2 VW)   Final Result   Probable mild emphysema and possible trace left pleural effusion. No acute infiltrates are identified. CT Head WO Contrast   Final Result     Mild diffuse cerebral atrophy and mild to moderate    periventricular white matter low density consistent with chronic    small vessel disease and/or senescent changes. There is no    evidence of acute large vessel infarct or intracranial hemorrhage.                  Consults:     IP CONSULT TO HOSPITALIST  IP CONSULT TO SOCIAL WORK  IP CONSULT TO PALLIATIVE CARE  PHARMACY TO DOSE VANCOMYCIN  IP CONSULT TO HOSPICE  IP CONSULT TO PALLIATIVE CARE    Disposition: Long-term care with hospice    Condition at Discharge: Terminal    Discharge Instructions/Follow-up: Comfort care    Code Status:  DNR-CC     Activity: activity as tolerated    Diet: regular diet      Discharge Medications:     Current Discharge Medication List           Details   !! acetaminophen (TYLENOL) 325 MG tablet Take 650 mg by mouth in the morning and at bedtime      !! divalproex (DEPAKOTE) 250 MG DR tablet Take 250 mg by mouth in the morning and at bedtime      !! divalproex (DEPAKOTE) 500 MG DR tablet Take 500 mg by mouth at bedtime      butalbital-acetaminophen-caffeine (FIORICET, ESGIC) -40 MG per tablet Take 1 tablet by mouth every 12 hours as needed for Headaches      melatonin 3 MG TABS tablet Take 5 mg by mouth at bedtime       methocarbamol (ROBAXIN) 500 MG tablet Take 500 mg by mouth every 6 hours as needed      tamsulosin (FLOMAX) 0.4 MG capsule Take 0.4 mg by mouth nightly      donepezil (ARICEPT) 10 MG tablet Take 10 mg by mouth nightly      !! acetaminophen (TYLENOL) 325 MG tablet Take 650 mg by mouth every 8 hours as needed for Pain       aluminum & magnesium hydroxide-simethicone (MAALOX) 200-200-20 MG/5ML SUSP suspension Take 30 mLs by mouth every 4 hours as needed for Indigestion      magnesium hydroxide (MILK OF MAGNESIA) 400 MG/5ML suspension Take 30 mLs by mouth daily as needed for Constipation      polyethylene glycol (GLYCOLAX) 17 g packet Take 17 g by mouth daily as needed for Constipation       pantoprazole (PROTONIX) 40 MG tablet 1 tab po daily  Qty: 90 tablet, Refills: 2       !! - Potential duplicate medications found. Please discuss with provider. Time Spent on discharge is more than 30 minutes in the examination, evaluation, counseling and review of medications and discharge plan. Signed:    Slade Murillo MD   3/7/2022      Thank you Josephine Willis MD for the opportunity to be involved in this patient's care.  If you have any questions or concerns please feel free to contact me at [phone].

## 2022-03-07 NOTE — PROGRESS NOTES
Patient discharged with transport to [facility] with hospice. IV removed. Patient cleaned and new brief placed on prior to leaving. Family has belongings. Paperwork sent with transport.

## 2022-03-07 NOTE — DISCHARGE INSTR - COC
Continuity of Care Form    Patient Name: Eliot Balderas   :  10/27/1932  MRN:  [id number]    Admit date:  3/5/2022  Discharge date:  ***    Code Status Order: DNR-CC   Advance Directives:      Admitting Physician:  Alexandro Tyler MD  PCP: Braulio Saravia MD    Discharging Nurse: [facility] Unit/Room#: 3816/6940-47  Discharging Unit Phone Number: ***    Emergency Contact:   Extended Emergency Contact Information  Primary Emergency Contact: [address] Phone: [phone]  Relation: Child  Secondary Emergency Contact: [address] Phone: [phone]  Relation: Child    Past Surgical History:  Past Surgical History:   Procedure Laterality Date    APPENDECTOMY      CHOLECYSTECTOMY      CORONARY ANGIOPLASTY WITH STENT PLACEMENT  2017    Stent X 1    JOINT REPLACEMENT Bilateral 1994    TONSILLECTOMY         Immunization History:   Immunization History   Administered Date(s) Administered    Influenza Vaccine, unspecified formulation 10/10/2016       Active Problems:  Patient Active Problem List   Diagnosis Code    Un[facility] ang[facility] I20.0    Essential hypertension I10    Abnormal nuclear stress test R94.39    Abnormal stress ECG R94.39    Coronary artery disease involving native coronary artery of native heart with angina pectoris ([facility] I25.119    Ischemic heart disease I25.9    Chest pain R07.9    Right upper quadrant abdominal pain R10.11    High cholesterol E78.00    Coronary artery disease I25.10    Seizure ([facility] R56.9    Hip fracture, right, closed, initial encounter ([facility] S72.001A    Sepsis ([facility] A41.9       Isolation/Infection:   Isolation            No Isolation          Patient Infection Status       Infection Onset Added Last Indicated Last Indicated By Review Planned Expiration Resolved Resolved By    None active    Resolved    COVID-19 (Rule Out) 22 COVID-19 & Influenza Combo (Ordered) 22 Rule-Out Test Resulted    COVID-19 (Rule Out) 21 COVID-19 (Ordered)   21     COVID-19 03/14/21 03/15/21 03/14/21 COVID-19   21     COVID-19 (Rule Out) 21 COVID-19 (Ordered)   03/15/21 Rule-Out Test Resulted            Nurse Assessment:  Last Vital Signs: /65   Pulse 98   Temp 100.8 °F (38.2 °C) (Axillary)   Resp 18   Wt 152 lb 12.5 oz (69.3 kg)   SpO2 92%   BMI 20.72 kg/m²     Last documented pain score (0-10 scale): Pain Level:  (t 100.8)  Last Weight:   Wt Readings from Last 1 Encounters:   22 152 lb 12.5 oz (69.3 kg)     Mental Status:  disoriented    IV Access:  - None    Nursing Mobility/ADLs:  Walking   Dependent  Transfer  Dependent  Bathing  Dependent  Dressing  Dependent  Toileting  Dependent  Feeding  Dependent  Med Admin  Dependent  Med Delivery   none    Wound Care Documentation and Therapy:  Wound 22 Arm Right (Active)   Dressing Status New dressing applied;Clean; Intact 22   Wound Cleansed Not Cleansed 22   Dressing/Treatment Non adherent 22   Wound Assessment Pink/red;Purple/maroon 22   Drainage Amount Moderate 22   Drainage Description Sanguinous 22   Odor None 22   [place]-wound Assessment Ecchymosis 22   Number of days: 1        Elimination:  Continence: Bowel: No   Bladder: No  Urinary Catheter: None   Colostomy/Ileostomy/Ileal Conduit: No    Date of Last BM:     Intake/Output Summary (Last 24 hours) at 3/7/2022 1307  Last data filed at 3/6/2022 1445  Gross per 24 hour   Intake 600 ml   Output --   Net 600 ml     I/O last 3 completed shifts:   In: 2332.8 [I.V.:600; IV Piggyback:1732.8]  Out: -     Safety Concerns:     History of Falls (last 30 days) and At Risk for Falls    Impairments/Disabilities:      None    Nutrition Therapy:  Current Nutrition Therapy:   - Oral Diet:  General    Routes of Feeding: Oral  Liquids: Thin  Daily Fluid Restriction: no  Last Modified Barium Swallow with Video (Video Swallowing Test): not done    Treatments at the Time of Hospital Discharge:   Respiratory Treatments: ***  Oxygen Therapy:  is not on home oxygen therapy. Ventilator:    - No ventilator support    Rehab Therapies: N/A  Weight Bearing Status/Restrictions: No weight bearing restirctions  Other Medical Equipment (for information only, NOT a DME order): N/A  Other Treatments:     Patient's personal belongings (please select all that are sent with patient):  None    RN SIGNATURE:  Electronically signed by Michael Parmar RN on 3/7/22 at 1:25 PM EST    CASE MANAGEMENT/SOCIAL WORK SECTION    Inpatient Status Date: ***    Readmission Risk Assessment Score:  Readmission Risk              Risk of Unplanned Readmission:  20           Discharging to Facility/ Agency   Name:   Address:  Phone:  Fax:    Dialysis Facility (if applicable)   Name:  Address:  Dialysis Schedule:  Phone:  Fax:    / signature: {Esignature:[id number]}    PHYSICIAN SECTION    Prognosis: {Prognosis:8974730436}    Condition at Discharge: [address] [place] [address] Patient Condition:737481006}    Rehab Potential (if transferring to Rehab): {Prognosis:0581760865}    Recommended Labs or Other Treatments After Discharge: ***    Physician Certification: I certify the above information and transfer of Eliot Balderas  is necessary for the continuing treatment of the diagnosis listed and that he requires {Admit to Appropriate Level of Care:98937} for {GREATER/LESS:476064310} 30 days.      Update Admission H&P: {CHP DME Changes in [facility]:758483274}    PHYSICIAN SIGNATURE:  {Esignature:[id number]}

## 2022-03-07 NOTE — PROGRESS NOTES
Physician Progress Note      Germaine Cordon  CSN #:                  [id number]  :                       10/27/1932  ADMIT DATE:       3/5/2022 11:31 PM  [address] [place] DATE:  RESPONDING  PROVIDER #:        Asuncion Tang MD          QUERY TEXT:    Pt admitted with sepsis-asp pna and has encephalopathy documented. If   possible, please document in progress notes and discharge summary further   specificity regarding the type of encephalopathy:    The medical record reflects the following:  Risk Factors: 81 yo w/ sepsis, possible aspiration pneumonia. hx of advanced   dementia. Clinical Indicators: Per ED: patient from SNF and has been moaning with   nonsensical speech for the last 2 days. Per patient's son, patient is   normally able to communicate and express needs. Per ED: Sepsis with   encephalopathy. Treatment: Bed alarm, fall precautions, Head CT  Options provided:  -- Metabolic encephalopathy  -- Septic encephalopathy  -- Other - I will add my own diagnosis  -- Disagree - Not applicable / Not valid  -- Disagree - Clinically unable to determine / Unknown  -- Refer to Clinical Documentation Reviewer    PROVIDER RESPONSE TEXT:    This patient has metabolic encephalopathy.     Query created by: Dasha Bell on 3/7/2022 8:28 AM      Electronically signed by:  Asuncion Tang MD 3/7/2022 1:05 PM

## 2022-03-07 NOTE — CONSULTS
Noted that hospice has been consulted and is working on nursing facility placement. D/w YANI Marx. No palliative care needs identified at this time. Please call if there is something I can help with.     Jamin Lee NP  [facility]

## 2022-03-08 LAB
BLOOD CULTURE, ROUTINE: ABNORMAL
BLOOD CULTURE, ROUTINE: ABNORMAL
ORGANISM: ABNORMAL
ORGANISM: ABNORMAL

## 2022-03-09 NOTE — PROGRESS NOTES
Physician Progress Note      PATIENTSaint Finer  [facility] #:                  [id number]  :                       10/27/1932  ADMIT DATE:       3/5/2022 11:31 PM  [address] DATE:        3/7/2022 3:00 PM  RESPONDING  PROVIDER #:        Mando Mack MD          QUERY TEXT:    Patient admitted with AMS. Documentation reflects sepsis due to pneumonia in   the H&P. If possible, please document in the progress notes and discharge   summary if Pneumonia was: The medical record reflects the following:  Risk Factors: 79 yo from NH w/ bi basilar crackles, expiratory wheezing  Clinical Indicators: Per H&P:  sepsis; differential includes HAP/ aspiration   PNA as seen on CT A/P.  CT scan 3/6: Scattered infiltrates in the right middle   lobe and both lower lobes, left greater than right could represent pneumonia,    atelectasis, or scarring. Per DC summary: Patient was admitted for sepsis,   LEYDI and acute metabolic encephalopathy likely secondary to group B   Streptococcus bacteremia. Treatment: IV Cefepime, IV Vanc, CXR, CT scan  Options provided:  -- Pneumonia confirmed after study  -- Pneumonia ruled out after study  -- Other - I will add my own diagnosis  -- Disagree - Not applicable / Not valid  -- Disagree - Clinically unable to determine / Unknown  -- Refer to Clinical Documentation Reviewer    PROVIDER RESPONSE TEXT:    Pneumonia confirmed after study.     Query created by: Denver Eva on 3/9/2022 9:38 AM      Electronically signed by:  Mando Mack MD 3/9/2022 11:51 AM

## 2022-03-10 LAB — CULTURE, BLOOD 2: NORMAL

## 2022-11-23 PROBLEM — Z51.5 HOSPICE CARE: Status: ACTIVE | Noted: 2022-03-30
